# Patient Record
Sex: MALE | Race: WHITE | NOT HISPANIC OR LATINO | Employment: OTHER | URBAN - METROPOLITAN AREA
[De-identification: names, ages, dates, MRNs, and addresses within clinical notes are randomized per-mention and may not be internally consistent; named-entity substitution may affect disease eponyms.]

---

## 2021-08-17 ENCOUNTER — HISTORICAL (OUTPATIENT)
Dept: ADMINISTRATIVE | Facility: HOSPITAL | Age: 76
End: 2021-08-17

## 2021-10-26 ENCOUNTER — HISTORICAL (OUTPATIENT)
Dept: ADMINISTRATIVE | Facility: HOSPITAL | Age: 76
End: 2021-10-26

## 2022-04-10 ENCOUNTER — HISTORICAL (OUTPATIENT)
Dept: ADMINISTRATIVE | Facility: HOSPITAL | Age: 77
End: 2022-04-10

## 2022-04-11 ENCOUNTER — HISTORICAL (OUTPATIENT)
Dept: ADMINISTRATIVE | Facility: HOSPITAL | Age: 77
End: 2022-04-11

## 2022-04-28 VITALS
OXYGEN SATURATION: 96 % | HEIGHT: 69 IN | DIASTOLIC BLOOD PRESSURE: 69 MMHG | WEIGHT: 169.63 LBS | BODY MASS INDEX: 25.12 KG/M2 | SYSTOLIC BLOOD PRESSURE: 129 MMHG

## 2022-04-28 VITALS
SYSTOLIC BLOOD PRESSURE: 129 MMHG | OXYGEN SATURATION: 96 % | WEIGHT: 169.63 LBS | DIASTOLIC BLOOD PRESSURE: 69 MMHG | BODY MASS INDEX: 25.12 KG/M2 | HEIGHT: 69 IN

## 2022-10-24 DIAGNOSIS — R52 PAIN: Primary | ICD-10-CM

## 2022-10-24 RX ORDER — BUTALBITAL, ACETAMINOPHEN AND CAFFEINE 50; 325; 40 MG/1; MG/1; MG/1
TABLET ORAL
Qty: 30 TABLET | Refills: 3 | OUTPATIENT
Start: 2022-10-24 | End: 2022-11-28 | Stop reason: SDUPTHER

## 2022-10-26 DIAGNOSIS — Z12.5 SCREENING FOR MALIGNANT NEOPLASM OF PROSTATE: ICD-10-CM

## 2022-10-26 DIAGNOSIS — Z00.00 WELLNESS EXAMINATION: ICD-10-CM

## 2022-10-26 DIAGNOSIS — I10 HYPERTENSION, UNSPECIFIED TYPE: Primary | ICD-10-CM

## 2022-10-26 DIAGNOSIS — E78.5 HYPERLIPIDEMIA, UNSPECIFIED HYPERLIPIDEMIA TYPE: ICD-10-CM

## 2022-11-01 LAB
ALBUMIN SERPL-MCNC: 4.7 G/DL (ref 3.7–4.7)
ALBUMIN/GLOB SERPL: 2.4 {RATIO} (ref 1.2–2.2)
ALP SERPL-CCNC: 111 IU/L (ref 44–121)
ALT SERPL-CCNC: 13 IU/L (ref 0–44)
AST SERPL-CCNC: 13 IU/L (ref 0–40)
BASOPHILS # BLD AUTO: 0 X10E3/UL (ref 0–0.2)
BASOPHILS NFR BLD AUTO: 1 %
BILIRUB SERPL-MCNC: 0.2 MG/DL (ref 0–1.2)
BUN SERPL-MCNC: 17 MG/DL (ref 8–27)
BUN/CREAT SERPL: 17 (ref 10–24)
CALCIUM SERPL-MCNC: 9.6 MG/DL (ref 8.6–10.2)
CHLORIDE SERPL-SCNC: 94 MMOL/L (ref 96–106)
CHOLEST SERPL-MCNC: 282 MG/DL (ref 100–199)
CO2 SERPL-SCNC: 28 MMOL/L (ref 20–29)
CREAT SERPL-MCNC: 1.03 MG/DL (ref 0.76–1.27)
EOSINOPHIL # BLD AUTO: 0.4 X10E3/UL (ref 0–0.4)
EOSINOPHIL NFR BLD AUTO: 6 %
ERYTHROCYTE [DISTWIDTH] IN BLOOD BY AUTOMATED COUNT: 12.5 % (ref 11.6–15.4)
EST. GFR  (NO RACE VARIABLE): 75 ML/MIN/1.73
GLOBULIN SER CALC-MCNC: 2 G/DL (ref 1.5–4.5)
GLUCOSE SERPL-MCNC: 99 MG/DL (ref 70–99)
HCT VFR BLD AUTO: 40.8 % (ref 37.5–51)
HDLC SERPL-MCNC: 85 MG/DL
HGB BLD-MCNC: 13.5 G/DL (ref 13–17.7)
IMM GRANULOCYTES NFR BLD AUTO: 0 %
LDLC SERPL CALC-MCNC: 182 MG/DL (ref 0–99)
LYMPHOCYTES # BLD AUTO: 2.6 X10E3/UL (ref 0.7–3.1)
LYMPHOCYTES NFR BLD AUTO: 40 %
MCH RBC QN AUTO: 31.6 PG (ref 26.6–33)
MCHC RBC AUTO-ENTMCNC: 33.1 G/DL (ref 31.5–35.7)
MCV RBC AUTO: 96 FL (ref 79–97)
MONOCYTES # BLD AUTO: 0.8 X10E3/UL (ref 0.1–0.9)
MONOCYTES NFR BLD AUTO: 13 %
NEUTROPHILS # BLD AUTO: 2.6 X10E3/UL (ref 1.4–7)
NEUTROPHILS NFR BLD AUTO: 40 %
PLATELET # BLD AUTO: 243 X10E3/UL (ref 150–450)
POTASSIUM SERPL-SCNC: 5.6 MMOL/L (ref 3.5–5.2)
PROT SERPL-MCNC: 6.7 G/DL (ref 6–8.5)
PSA SERPL-MCNC: 2.3 NG/ML (ref 0–4)
RBC # BLD AUTO: 4.27 X10E6/UL (ref 4.14–5.8)
SODIUM SERPL-SCNC: 135 MMOL/L (ref 134–144)
TRIGL SERPL-MCNC: 91 MG/DL (ref 0–149)
VLDLC SERPL CALC-MCNC: 15 MG/DL (ref 5–40)
WBC # BLD AUTO: 6.5 X10E3/UL (ref 3.4–10.8)

## 2022-11-07 ENCOUNTER — OFFICE VISIT (OUTPATIENT)
Dept: PRIMARY CARE CLINIC | Facility: CLINIC | Age: 77
End: 2022-11-07
Payer: MEDICARE

## 2022-11-07 VITALS
OXYGEN SATURATION: 97 % | SYSTOLIC BLOOD PRESSURE: 139 MMHG | WEIGHT: 169 LBS | DIASTOLIC BLOOD PRESSURE: 60 MMHG | BODY MASS INDEX: 24.75 KG/M2 | HEART RATE: 83 BPM

## 2022-11-07 DIAGNOSIS — L40.9 PSORIASIS: ICD-10-CM

## 2022-11-07 DIAGNOSIS — D64.9 ANEMIA, UNSPECIFIED TYPE: ICD-10-CM

## 2022-11-07 DIAGNOSIS — H40.10X0 OPEN-ANGLE GLAUCOMA, UNSPECIFIED GLAUCOMA STAGE, UNSPECIFIED LATERALITY, UNSPECIFIED OPEN-ANGLE GLAUCOMA TYPE: ICD-10-CM

## 2022-11-07 DIAGNOSIS — E87.5 HYPERKALEMIA: ICD-10-CM

## 2022-11-07 DIAGNOSIS — Z00.00 WELLNESS EXAMINATION: Primary | ICD-10-CM

## 2022-11-07 DIAGNOSIS — E78.5 HYPERLIPIDEMIA, UNSPECIFIED HYPERLIPIDEMIA TYPE: ICD-10-CM

## 2022-11-07 DIAGNOSIS — Z12.5 PROSTATE CANCER SCREENING: ICD-10-CM

## 2022-11-07 DIAGNOSIS — Z23 NEED FOR VACCINATION: ICD-10-CM

## 2022-11-07 PROCEDURE — G0439 PPPS, SUBSEQ VISIT: HCPCS | Mod: ,,, | Performed by: FAMILY MEDICINE

## 2022-11-07 PROCEDURE — 90732 PNEUMOCOCCAL POLYSACCHARIDE VACCINE 23-VALENT =>2YO SQ IM: ICD-10-PCS | Mod: ,,, | Performed by: FAMILY MEDICINE

## 2022-11-07 PROCEDURE — G0009 PNEUMOCOCCAL POLYSACCHARIDE VACCINE 23-VALENT =>2YO SQ IM: ICD-10-PCS | Mod: ,,, | Performed by: FAMILY MEDICINE

## 2022-11-07 PROCEDURE — 90732 PPSV23 VACC 2 YRS+ SUBQ/IM: CPT | Mod: ,,, | Performed by: FAMILY MEDICINE

## 2022-11-07 PROCEDURE — G0439 PR MEDICARE ANNUAL WELLNESS SUBSEQUENT VISIT: ICD-10-PCS | Mod: ,,, | Performed by: FAMILY MEDICINE

## 2022-11-07 PROCEDURE — G0009 ADMIN PNEUMOCOCCAL VACCINE: HCPCS | Mod: ,,, | Performed by: FAMILY MEDICINE

## 2022-11-07 RX ORDER — PRAVASTATIN SODIUM 10 MG/1
10 TABLET ORAL
COMMUNITY
Start: 2021-11-04 | End: 2022-11-07 | Stop reason: SDUPTHER

## 2022-11-07 RX ORDER — PRAVASTATIN SODIUM 10 MG/1
10 TABLET ORAL EVERY OTHER DAY
Qty: 45 TABLET | Refills: 3 | Status: SHIPPED | OUTPATIENT
Start: 2022-11-07

## 2022-11-07 RX ORDER — DORZOLAMIDE HYDROCHLORIDE AND TIMOLOL MALEATE 20; 5 MG/ML; MG/ML
SOLUTION/ DROPS OPHTHALMIC
COMMUNITY
Start: 2022-09-29

## 2022-11-07 NOTE — PROGRESS NOTES
Patient ID: [id number]     Chief Complaint: Medicare AWV        HPI:     Fred Campos is a 76 y.o. male here today for a Medicare Wellness.  He is a retired pharmacist. Patient presents for wellness visit.  Lab work performed prior to this visit includes FLP with LDL elevated at 182 was increased from 121 after discontinuation of previously prescribed for pravastatin due to myalgia.  Labs include PSA at 2.3 with CBC revealing no overly concerning findings with CMP showing elevated potassium at 5.6. Reports daily banana consumption.    PMH:  Glaucoma-open angle, prior surgical intervention 2021   Chronic low back pain-prior left hemilaminectomy of L4/L5   Hyperlipidemia-houses with Crestor Lipitor, previously pravastatin  Psoriasis-as needed clobetasol  Migraines-as needed Fioricet      A separate E/M code has been provided to evaluate additional complaints that the patient would like addressed during the dedicated Medicare Wellness Exam.        ----------------------------  HLD (hyperlipidemia)     Past Surgical History:   Procedure Laterality Date    APPENDECTOMY      cataract surgery         Review of patient's allergies indicates:  No Known Allergies    Outpatient Medications Marked as Taking for the 22 encounter (Office Visit) with Alberto Poe MD   Medication Sig Dispense Refill    butalbital-acetaminophen-caffeine -40 mg (FIORICET, ESGIC) -40 mg per tablet TAKE ONE TABLET BY MOUTH EVERY 4 HOURS AS NEEDED (do not exceed SIX TABLET per day  Strength: -40 mg 30 tablet 3    dorzolamide-timolol 2-0.5% (COSOPT) 22.3-6.8 mg/mL ophthalmic solution SMARTSI Drop(s) Left Eye Morning-Night      [DISCONTINUED] pravastatin (PRAVACHOL) 10 MG tablet Take 10 mg by mouth.         Social History     Socioeconomic History    Marital status:    Tobacco Use    Smoking status: Never    Smokeless tobacco: Never   Substance and Sexual Activity    Alcohol use: Never    Drug use: Never     Sexual activity: Yes        History reviewed. No pertinent family history.     Patient Care Team:  Alberto Poe MD as PCP - General (Family Medicine)       Subjective:     Review of Systems   Constitutional:  Negative for chills and fever.   HENT:  Negative for congestion and sore throat.    Respiratory:  Negative for cough and wheezing.    Cardiovascular:  Negative for chest pain and palpitations.   Gastrointestinal:  Negative for nausea and vomiting.   Genitourinary:  Negative for dysuria and urgency.   Musculoskeletal:  Negative for joint pain and myalgias.   Skin:  Negative for itching and rash.   Neurological:  Negative for dizziness and weakness.   Psychiatric/Behavioral:  Negative for depression. The patient is not nervous/anxious.        Patient Reported Health Risk Assessments:  What [age] 70-79  Are you male or female?: Male  During the past four weeks, how much have you been bothered by emotional problems such as feeling anxious, depressed, irritable, sad, or downhearted and blue?: Not at all  During the past five weeks, has your physical and/or emotional health limited your social activities with family, friends, neighbors, or groups?: Not at all  During the past four weeks, how much bodily pain have you generally had?: Mild pain  During the past four weeks, was someone available to help if you needed and wanted help?: Yes, as much as I wanted  During the past four weeks, what was the hardest physical activity you could do for at least two minutes?: Moderate  Can you get to places out of walking distance without help?  (For example, can you travel alone on buses or taxis, or drive your own car?): Yes  Can you go shopping for groceries or clothes without someone's help?: Yes  Can you prepare your own meals?: Yes  Can you do your own housework without help?: Yes  Because of any health problems, do you need the help of another person with your personal care needs such as eating, bathing,  dressing, or getting around the house?: No  Can you handle your own money without help?: Yes  During the past four weeks, how would you rate your health in general?: Very good  How have things been going for you during the past four weeks?: Pretty well  Are you having difficulties driving your car?: No  Do you always fasten your seat belt when you are in a car?: Yes, usually  How often in the past four weeks have you been bothered by falling or dizzy when standing up?: Sometimes  How often in the past four weeks have you been bothered by sexual problems?: Never  How often in the past four weeks have you been bothered by trouble eating well?: Never  How often in the past four weeks have you been bothered by teeth or denture problems?: Never  How often in the past four weeks have you been bothered with problems using the telephone?: Never  How often in the past four weeks have you been bothered by tiredness or fatigue?: Never  Have you fallen two or more times in the past year?: No  Are you afraid of falling?: No  Are you a smoker?: Yes, I'm not ready to quit  During the past four weeks, how many drinks of wine, beer, or other alcoholic beverages did you have?: No alcohol at all  Do you exercise for about 20 minutes three or more days a week?: No, I usually do not exercise this much  Have you been given any information to help you with hazards in your house that might hurt you?: Yes  Have you been given any information to help you with keeping track of your medications?: Yes  How often do you have trouble taking medicines the way you've been told to take them?: I always take them as prescribed  How confident are you that you can control and manage most of your health problems?: Very confident    Objective:     /60   Pulse 83   Wt 76.7 kg (169 lb)   SpO2 97%   BMI 24.75 kg/m²     Physical Exam  Constitutional:       Appearance: Normal appearance.   HENT:      Head: Normocephalic and atraumatic.   Eyes:       General:         Right eye: No discharge.         Left eye: No discharge.      Conjunctiva/sclera: Conjunctivae normal.   Cardiovascular:      Rate and Rhythm: Normal rate and regular rhythm.   Pulmonary:      Effort: Pulmonary effort is normal.      Breath sounds: Normal breath sounds.   Abdominal:      Palpations: Abdomen is soft.      Tenderness: There is no abdominal tenderness.   Skin:     General: Skin is warm and dry.   Neurological:      General: No focal deficit present.      Mental Status: He is alert and oriented to person, place, and time.   Psychiatric:         Mood and Affect: Mood normal.         Behavior: Behavior normal.         Assessment:       ICD-10-CM ICD-9-CM   1. Wellness examination  Z00.00 V70.0   2. Hyperlipidemia, unspecified hyperlipidemia type  E78.5 272.4   3. Open-angle glaucoma, unspecified glaucoma stage, unspecified laterality, unspecified open-angle glaucoma type  H40.10X0 365.10     365.70   4. Psoriasis  L40.9 696.1   5. Hyperkalemia  E87.5 276.7   6. Prostate cancer screening  Z12.5 V76.44   7. Anemia, unspecified type  D64.9 285.9   8. Need for vaccination  Z23 V05.9        Plan:       Medicare Annual Wellness and Personalized Prevention Plan:     Fall Risk + Home Safety + Living Situation + Whisper Test + Depression Screen + CAGE + Cognitive Impairment Screen + ADL Screen + Timed Get Up and Go + Nutrition Screen + PAQ Screen + Health Risk Assessment all reviewed.     No flowsheet data found.  Fall Risk Assessment - Outpatient 11/7/2022   Mobility Status Ambulatory   Number of falls 0   Identified as fall risk 0                   Depression Screening  Over the past two weeks, has the patient felt down, depressed, or hopeless?: No  Over the past two weeks, has the patient felt little interest or pleasure in doing things?: No  Functional Ability/Safety Screening  Was the patient's timed Up & Go test unsteady or longer than 30 seconds?: No  Does the patient need help with phone,  transportation, shopping, preparing meals, housework, laundry, meds, or managing money?: No  Does the patient's home have rugs in the hallway, lack grab bars in the bathroom, lack handrails on the stairs or have poor lighting?: No  Have you noticed any hearing difficulties?: No  Cognitive Function (Assessed through direct observation with due consideration of information obtained by way of patient reports and/or concerns raised by family, friends, caretakers, or others)    Does the patient repeat questions/statements in the same day?: No  Does the patient have trouble remembering the date, year, and time?: No  Does the patient have difficulty managing finances?: No  Does the patient have a decreased sense of direction?: No      Offer of free  was accepted or rejected?: rejected  If  rejected, why?: Patient states understands English    Alcohol/Tobacco Use - Stressed importance of smoking cessation and limiting alcohol intake.  CVD Risk Factors - Reviewed  Obesity/Physical Activity - Normal BMI. Encouraged daily 30 minute physical activity x 5 days per week.    Opioid Screening: Patient medication list reviewed, patient is not taking prescription opioids. Patient is not using additional opioids than prescribed. Patient is at low risk of substance abuse based on this opioid use history.       Health Maintenance Topics with due status: Not Due       Topic Last Completion Date    Lipid Panel 11/01/2022        AAA Screening - No prior screening. Will order US AAA for Welcome to Medicare.  Prostate Cancer Screening - No prior screening. Will order PSA today. 1/2018. Follow up annually.  Cervical Cancer Screening - Last Pap on - 1/2018. NIL. Follow up with GYN Clinic for Pap/Pelvic.   Breast Cancer Screening - Last Mammogram on 1/2018. Normal. Follow up in 1 year    Colon Cancer Screening - Colonoscopy on 1/2018. Follow up in 1 year . FIT Negative on 1/2018.  Osteoporosis Screening - Last  DEXA on 1/2018. Results show Normal Bone Density. Follow up in 1 year.  Eye Exam - Last Eye Exam - 1/2018.  Dental Exam - Recommend biannually  Vaccinations -   Immunization History   Administered Date(s) Administered    Pneumococcal Polysaccharide - 23 Valent 11/07/2022        Advance Directives:   Living Will: No        Oral Declaration: No    [place]: No    Do Not Resuscitate Status: No    Medical Power of : No        Oral Declaration: No      Decision Making:  Patient answered questions  Goals of Care: The patient endorses that what is most important right now is to focus on remaining as independent as possible    Accordingly, we have decided that the best plan to meet the patient's goals includes continuing with treatment  Advance Care Planning   Advanced Care Planning: I offered to discuss Advanced Care Planning, which consists of how you would like to be cared for as you end the near of your natural life.  This includes how to pick a person who would make decisions for you if you were unable to make them for yourself, which is called a Medical Power of . These discussions include what kind of decisions you will make regarding life sustaining measures, such as ventilators and feeding tubes, when faced with a life limiting illness recorded on a living will that they will need to know. Advanced Directive discussion declined by patient.  Spent 20 minutes with the patient/family on the importance of Advanced Care Planning.          1. Wellness examination  Assessment & Plan:  Discussed routine annual health maintenance and screening in addition to acute issues noted below.    Orders:  -     CBC Auto Differential; Future; Expected date: 11/07/2023  -     Comprehensive Metabolic Panel; Future; Expected date: 11/07/2023    2. Hyperlipidemia, unspecified hyperlipidemia type  Assessment & Plan:  Recommend reinitiation and compliance with statin medication and minimal dose of 10 mg every other day  pravastatin    Orders:  -     Lipid Panel; Future; Expected date: 2023  -     Lipid Panel; Future; Expected date: 2023    3. Open-angle glaucoma, unspecified glaucoma stage, unspecified laterality, unspecified open-angle glaucoma type  Overview:  Follow-up with eye specialist for routine monitoring and management      4. Psoriasis  Overview:  PRN clobetasol      5. Hyperkalemia  Overview:  Deescalate potassium intake with diet reassess levels at follow-up in consider the Lasix or Kayexalate if persistent elevation is noted    Orders:  -     Comprehensive Metabolic Panel; Future; Expected date: 2023    6. Prostate cancer screening  -     PSA, Screening; Future; Expected date: 2023    7. Anemia, unspecified type  -     CBC Auto Differential; Future; Expected date: 2023    8. Need for vaccination  -     Pneumococcal Polysaccharide Vaccine (23 Valent) (SQ/IM)    Other orders  -     pravastatin (PRAVACHOL) 10 MG tablet; Take 1 tablet (10 mg total) by mouth every other day.  Dispense: 45 tablet; Refill: 3         Medication List with Changes/Refills   Current Medications    BUTALBITAL-ACETAMINOPHEN-CAFFEINE -40 MG (FIORICET, ESGIC) -40 MG PER TABLET    TAKE ONE TABLET BY MOUTH EVERY 4 HOURS AS NEEDED (do not exceed SIX TABLET per day  Strength: -40 mg       Start Date: 10/24/2022End Date: --    DORZOLAMIDE-TIMOLOL 2-0.5% (COSOPT) 22.3-6.8 MG/ML OPHTHALMIC SOLUTION    SMARTSI Drop(s) Left Eye Morning-Night       Start Date: 2022 End Date: --   Changed and/or Refilled Medications    Modified Medication Previous Medication    PRAVASTATIN (PRAVACHOL) 10 MG TABLET pravastatin (PRAVACHOL) 10 MG tablet       Take 1 tablet (10 mg total) by mouth every other day.    Take 10 mg by mouth.       Start Date: 2022 End Date: --    Start Date: 2021 End Date: 2022          The patient's Health Maintenance was reviewed and the following appears to be due at this time:    Health Maintenance Due   Topic Date Due    Hepatitis C Screening  Never done    TETANUS VACCINE  Never done    Shingles Vaccine (1 of 2) Never done    COVID-19 Vaccine (3 - Booster for Moderna series) 04/26/2021    Influenza Vaccine (1) Never done           Follow up in about 1 year (around 11/8/2023). In addition to their scheduled follow up, the patient has also been instructed to follow up on as needed basis.     Provided patient with a 5-10 year written screening schedule and personal prevention plan. Recommendations were developed using the USPSTF age appropriate recommendations. Education, counseling, and referrals were provided as needed. After Visit Summary printed and given to patient which includes a list of additional screenings\tests needed.

## 2022-11-07 NOTE — ASSESSMENT & PLAN NOTE
Recommend reinitiation and compliance with statin medication and minimal dose of 10 mg every other day pravastatin

## 2022-11-28 DIAGNOSIS — R52 PAIN: ICD-10-CM

## 2022-11-28 RX ORDER — BUTALBITAL, ACETAMINOPHEN AND CAFFEINE 50; 325; 40 MG/1; MG/1; MG/1
TABLET ORAL
Qty: 30 TABLET | Refills: 3 | OUTPATIENT
Start: 2022-11-28 | End: 2022-11-29 | Stop reason: SDUPTHER

## 2022-11-29 DIAGNOSIS — R52 PAIN: ICD-10-CM

## 2022-11-29 RX ORDER — BUTALBITAL, ACETAMINOPHEN AND CAFFEINE 50; 325; 40 MG/1; MG/1; MG/1
TABLET ORAL
Qty: 30 TABLET | Refills: 3 | OUTPATIENT
Start: 2022-11-29 | End: 2022-12-06

## 2023-01-17 DIAGNOSIS — R52 PAIN: ICD-10-CM

## 2023-01-17 RX ORDER — BUTALBITAL, ACETAMINOPHEN AND CAFFEINE 50; 325; 40 MG/1; MG/1; MG/1
TABLET ORAL
Qty: 30 TABLET | Refills: 0 | Status: SHIPPED | OUTPATIENT
Start: 2023-01-17 | End: 2023-04-04

## 2023-02-06 PROBLEM — Z00.00 WELLNESS EXAMINATION: Status: RESOLVED | Noted: 2022-11-07 | Resolved: 2023-02-06

## 2023-06-27 DIAGNOSIS — R52 PAIN: ICD-10-CM

## 2023-06-27 RX ORDER — BUTALBITAL, ACETAMINOPHEN AND CAFFEINE 50; 325; 40 MG/1; MG/1; MG/1
1 TABLET ORAL EVERY 6 HOURS PRN
Qty: 30 TABLET | Refills: 3 | Status: SHIPPED | OUTPATIENT
Start: 2023-06-27 | End: 2024-02-05 | Stop reason: SDUPTHER

## 2023-10-30 ENCOUNTER — TELEPHONE (OUTPATIENT)
Dept: PRIMARY CARE CLINIC | Facility: CLINIC | Age: 78
End: 2023-10-30
Payer: MEDICARE

## 2023-10-30 NOTE — TELEPHONE ENCOUNTER
Labs faxed to [facility]  ----- Message from Shanique Rivas sent at 10/30/2023 11:07 AM CDT -----  Regarding: orders  .Caller is requesting to schedule their Lab appointment prior to annual appointment.  Order is not listed in EPIC.  Please enter order and contact patient to schedule.    Name of Caller:ross Hull Date and Time of Labs:10/30    Date of EPP Appointment:11/08    Where would they like the lab performed?[facility]    Would the patient rather a call back or a response via [facility]? foster    Best Call Back Number:[phone]     Additional Information:pt needs orders sent to [facility] in [place] pls call when sent

## 2023-10-30 NOTE — PROGRESS NOTES
Date: 11/08/2023  Patient ID: [id number]   Chief Complaint: Medicare AWV    HPI:   Fred Campos is a 77 y.o. male here today for a Medicare Wellness.     Opioid Screening: Patient medication list reviewed, patient is not taking prescription opioids. Patient is not using additional opioids than prescribed. Patient is at low risk of substance abuse based on this opioid use history.     Otherwise, patient reports to be doing well. WNL CMP, CBC; cholesterol and LDL elevated; PSA increased to 3.4. Has not been on statin 2/2 myalgia. Not interested in lipid medications. 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 Not interested in other medications    Diet: avoids fats; eats lean meats.  Activity level: plays golf. Home exercises 20min 5d/wk  PSA: 3.4  CRC: last time wnl and no longer participating     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    Psoriasis    Elevated lipoprotein(a)    Elevated blood pressure reading    Myalgia due to statin    Tobacco use    PVD (peripheral vascular disease)    Valvular heart disease    Hiatal hernia     Outpatient Medications Marked as Taking for the 11/8/23 encounter (Office Visit) with Ronna Shirley MD   Medication Sig Dispense Refill    butalbital-acetaminophen-caffeine -40 mg (FIORICET, ESGIC) -40 mg per tablet Take 1 tablet by mouth every 6 (six) hours as needed for Pain or Headaches. 30 tablet 3    dorzolamide (TRUSOPT) 2 % ophthalmic solution Place 1 drop into the left eye 2 (two) times daily.      famotidine (PEPCID) 20 MG tablet Take 20 mg by mouth Daily.       Past Medical History:   Diagnosis Date    Hiatal hernia 11/8/2023    HLD (hyperlipidemia)     Hyperkalemia 11/7/2022    Deescalate potassium intake with diet reassess levels at follow-up in consider the Lasix or Kayexalate if persistent elevation is noted    Myalgia due to statin 11/8/2023    PVD (peripheral vascular disease)  11/8/2023    Tobacco use 11/8/2023    Valvular heart disease 11/8/2023     Past Surgical History:   Procedure Laterality Date    APPENDECTOMY      cataract surgery       Review of patient's allergies indicates:  No Known Allergies  History reviewed. No pertinent family history.   Social History     Socioeconomic History    Marital status:    Tobacco Use    Smoking status: Every Day     Types: Cigarettes    Smokeless tobacco: Never    Tobacco comments:     1ppd since 17y   Substance and Sexual Activity    Alcohol use: Yes     Comment: once at night    Drug use: Never    Sexual activity: Yes     Social Determinants of Health     Financial Resource Strain: Low Risk  (11/8/2023)    Overall Financial Resource Strain (CARDIA)     Difficulty of Paying Living Expenses: Not hard at all   Food Insecurity: No Food Insecurity (11/8/2023)    Hunger Vital Sign     Worried About Running Out of Food in the Last Year: Never true     Ran Out of Food in the Last Year: Never true   Transportation Needs: No Transportation Needs (11/8/2023)    PRAPARE - Transportation     Lack of Transportation (Medical): No     Lack of Transportation (Non-Medical): No   Physical Activity: Insufficiently Active (11/8/2023)    Exercise Vital Sign     Days of Exercise per Week: 3 days     Minutes of Exercise per Session: 20 min   Stress: No Stress Concern Present (11/8/2023)    [place] [employer] - Occupational Stress Questionnaire     Feeling of Stress : Not at all   Social Connections: Moderately Integrated (11/8/2023)    Social Connection and Isolation Panel [NHANES]     Frequency of Communication with Friends and Family: Twice a week     Frequency of Social Gatherings with Friends and Family: Once a week     Attends [employer] Services: More than 4 times per year     Active Member of Clubs or Organizations: No     Attends Club or Organization Meetings: Never     Marital Status:    Housing Stability: Unknown  (11/8/2023)    Housing Stability Vital Sign     Unable to Pay for Housing in the Last Year: No     Unstable Housing in the Last Year: No     Patient Care Team:  Ronna Shirley MD as PCP - General (Family Medicine)  Stef Syed MD as Consulting Physician (Cardiology)  Lona Gutierrez MD as Consulting Physician (Ophthalmology)   Subjective:   Review of Systems   Constitutional:  Negative for fever and weight loss.   HENT:  Negative for congestion, hearing loss and sore throat.    Eyes:  Negative for blurred vision and double vision.   Respiratory:  Negative for cough and shortness of breath.    Cardiovascular:  Negative for chest pain and palpitations.   Gastrointestinal:  Negative for abdominal pain and diarrhea.   Genitourinary:  Negative for dysuria, frequency, hematuria and urgency.   Musculoskeletal:  Negative for falls.   Skin:  Negative for rash.   Psychiatric/Behavioral:  Negative for depression and memory loss. The patient is not nervous/anxious and does not have insomnia.      See HPI for details  All Other ROS: Negative except as stated in HPI  Patient Reported Health Risk Assessment  What [age] 70-79  Are you male or female?: Male  During the past four weeks, how much have you been bothered by emotional problems such as feeling anxious, depressed, irritable, sad, or downhearted and blue?: Not at all  During the past five weeks, has your physical and/or emotional health limited your social activities with family, friends, neighbors, or groups?: Not at all  During the past four weeks, how much bodily pain have you generally had?: Mild pain  During the past four weeks, was someone available to help if you needed and wanted help?: Yes, as much as I wanted  During the past four weeks, what was the hardest physical activity you could do for at least two minutes?: Moderate  Can you get to places out of walking distance without help?  (For example, can you travel alone on buses or taxis, or drive  your own car?): Yes  Can you go shopping for groceries or clothes without someone's help?: Yes  Can you prepare your own meals?: Yes  Can you do your own housework without help?: Yes  Because of any health problems, do you need the help of another person with your personal care needs such as eating, bathing, dressing, or getting around the house?: No  Can you handle your own money without help?: Yes  During the past four weeks, how would you rate your health in general?: Good  How have things been going for you during the past four weeks?: Pretty well  Are you having difficulties driving your car?: No  Do you always fasten your seat belt when you are in a car?: Yes, usually  How often in the past four weeks have you been bothered by falling or dizzy when standing up?: Never  How often in the past four weeks have you been bothered by sexual problems?: Never  How often in the past four weeks have you been bothered by trouble eating well?: Never  How often in the past four weeks have you been bothered by teeth or denture problems?: Never  How often in the past four weeks have you been bothered with problems using the telephone?: Never  How often in the past four weeks have you been bothered by tiredness or fatigue?: Never  Have you fallen two or more times in the past year?: No  Are you afraid of falling?: No  Are you a smoker?: No  During the past four weeks, how many drinks of wine, beer, or other alcoholic beverages did you have?: No alcohol at all  Do you exercise for about 20 minutes three or more days a week?: Yes, most of the time  Have you been given any information to help you with hazards in your house that might hurt you?: Yes  Have you been given any information to help you with keeping track of your medications?: Yes  How often do you have trouble taking medicines the way you've been told to take them?: I always take them as prescribed  How confident are you that you can control and manage most of your  "health problems?: Very confident  What is your race? (Check all that apply.):   Objective:   BP (!) 138/59   Pulse 78   Temp 97.7 °F (36.5 °C)   Ht 5' 9" (1.753 m)   Wt 77.5 kg (170 lb 14.4 oz)   SpO2 (!) 92%   BMI 25.24 kg/m²   Physical Exam  Constitutional:       Appearance: Normal appearance.   HENT:      Head: Normocephalic and atraumatic.      Right Ear: Tympanic membrane, ear canal and external ear normal.      Left Ear: Tympanic membrane, ear canal and external ear normal.      Nose: No congestion or rhinorrhea.      Mouth/Throat:      Mouth: Mucous membranes are moist.      Pharynx: No oropharyngeal exudate or posterior oropharyngeal erythema.   Eyes:      General: No scleral icterus.        Right eye: No discharge.         Left eye: No discharge.      Extraocular Movements: Extraocular movements intact.      Conjunctiva/sclera: Conjunctivae normal.      Comments: L cornea with irregular shape   Cardiovascular:      Rate and Rhythm: Normal rate and regular rhythm.      Pulses: Normal pulses.      Heart sounds: Normal heart sounds.      Comments: Thready pulses BL  Pulmonary:      Effort: Pulmonary effort is normal.      Breath sounds: Wheezing (denies SOB or meds at this time) present.   Abdominal:      General: Abdomen is flat. Bowel sounds are normal.      Palpations: Abdomen is soft.   Musculoskeletal:         General: No deformity. Normal range of motion.      Cervical back: Normal range of motion and neck supple.   Skin:     General: Skin is warm and dry.   Neurological:      Mental Status: He is alert and oriented to person, place, and time.   Psychiatric:         Mood and Affect: Mood normal.         Behavior: Behavior normal.         Thought Content: Thought content normal.         Judgment: Judgment normal.            No data to display                  11/8/2023     1:00 PM 11/7/2022     2:00 PM   Fall Risk Assessment - Outpatient   Mobility Status Ambulatory Ambulatory   Number of " falls 0 0   Identified as fall risk False False           Depression Screening  Over the past two weeks, has the patient felt down, depressed, or hopeless?: No  Over the past two weeks, has the patient felt little interest or pleasure in doing things?: No  Functional Ability/Safety Screening  Was the patient's timed Up & Go test unsteady or longer than 30 seconds?: No  Does the patient need help with phone, transportation, shopping, preparing meals, housework, laundry, meds, or managing money?: No  Does the patient's home have rugs in the hallway, lack grab bars in the bathroom, lack handrails on the stairs or have poor lighting?: No  Have you noticed any hearing difficulties?: No  Cognitive Function (Assessed through direct observation with due consideration of information obtained by way of patient reports and/or concerns raised by family, friends, caretakers, or others)    Does the patient repeat questions/statements in the same day?: No  Does the patient have trouble remembering the date, year, and time?: No  Does the patient have difficulty managing finances?: No  Does the patient have a decreased sense of direction?: No  Assessment:       ICD-10-CM ICD-9-CM   1. Wellness examination  Z00.00 V70.0   2. Elevated lipoprotein(a)  E78.41 272.8   3. Tobacco use  Z72.0 305.1      Plan:   1. Wellness examination  Overview:  Routine labs and preventative care discussed.  Continue healthy lifestyle modifications      Orders:  -     CBC Auto Differential; Future; Expected date: 11/08/2024  -     Comprehensive Metabolic Panel; Future; Expected date: 11/08/2024  -     Lipid Panel; Future; Expected date: 11/08/2024  -     TSH; Future; Expected date: 11/08/2024  -     Hemoglobin A1C; Future; Expected date: 11/08/2024  -     Urinalysis; Future; Expected date: 11/08/2024  -     PSA, Screening; Future; Expected date: 11/08/2024  -     Hepatitis C Antibody; Future; Expected date: 11/08/2024    2. Elevated  lipoprotein(a)  Overview:  Did not tolerate statins  Not interested in meds at this time  Can try omega-3 fatty acids (total,LDL,TG), niacin (TG), CoQ10 (total), Red yeast rice (LDL,TG,inc HDL)  Encourage weight loss by least 5% and to increase aerobic exercise and resistance training  Limit simple sugars and simple carbohydrate intake-focus on low glycemic foods  Optimize blood sugar control  The 10-year ASCVD risk score (Yojana COX, et al., 2019) is: 37.3%    Values used to calculate the score:      Age: 77 years      Sex: Male      Is Non- : No      Diabetic: No      Tobacco smoker: No      Systolic Blood Pressure: 155 mmHg      Is BP treated: No      HDL Cholesterol: 85 mg/dL      Total Cholesterol: 308 mg/dL           Orders:  -     CBC Auto Differential; Future; Expected date: 11/08/2024  -     Comprehensive Metabolic Panel; Future; Expected date: 11/08/2024  -     Lipid Panel; Future; Expected date: 11/08/2024  -     Hemoglobin A1C; Future; Expected date: 11/08/2024  -     Urinalysis; Future; Expected date: 11/08/2024    3. Tobacco use  Overview:  1ppd since 17y  Counseled patient on tobacco cessation and risks of continued tobacco use 3-5min      Orders:  -     Lipid Panel; Future; Expected date: 11/08/2024  -     Urinalysis; Future; Expected date: 11/08/2024  -     PSA, Screening; Future; Expected date: 11/08/2024         Medicare Annual Wellness and Personalized Prevention Plan:   Fall Risk + Home Safety + Hearing Impairment + Depression Screen + Opioid and Substance Abuse Screening + Cognitive Impairment Screen + Health Risk Assessment all reviewed.     Health Maintenance Topics with due status: Not Due       Topic Last Completion Date    Lipid Panel 10/31/2023      The patient's Health Maintenance was reviewed and the following appears to be due at this time:   Health Maintenance Due   Topic Date Due    Hepatitis C Screening  Never done    TETANUS VACCINE  Never done    Shingles  Vaccine (1 of 2) Never done    RSV Vaccine (Age 60+) (1 - 1-dose 60+ series) Never done    Influenza Vaccine (1) Never done    COVID-19 Vaccine (3 - 2023-24 season) 09/01/2023       Advance Care Planning     Date: 10/30/2023    Living Will, POA           Follow up in about 1 year (around 11/8/2024) for Medicare Wellness. In addition to their scheduled follow up, the patient has also been instructed to follow up on as needed basis.

## 2023-10-31 LAB
ALBUMIN SERPL-MCNC: 4.6 G/DL (ref 3.8–4.8)
ALBUMIN/GLOB SERPL: 2.1 {RATIO} (ref 1.2–2.2)
ALP SERPL-CCNC: 109 IU/L (ref 44–121)
ALT SERPL-CCNC: 13 IU/L (ref 0–44)
AST SERPL-CCNC: 13 IU/L (ref 0–40)
BASOPHILS # BLD AUTO: 0 X10E3/UL (ref 0–0.2)
BASOPHILS NFR BLD AUTO: 1 %
BILIRUB SERPL-MCNC: 0.2 MG/DL (ref 0–1.2)
BUN SERPL-MCNC: 15 MG/DL (ref 8–27)
BUN/CREAT SERPL: 16 (ref 10–24)
CALCIUM SERPL-MCNC: 9.5 MG/DL (ref 8.6–10.2)
CHLORIDE SERPL-SCNC: 94 MMOL/L (ref 96–106)
CHOLEST SERPL-MCNC: 308 MG/DL (ref 100–199)
CO2 SERPL-SCNC: 29 MMOL/L (ref 20–29)
CREAT SERPL-MCNC: 0.92 MG/DL (ref 0.76–1.27)
EOSINOPHIL # BLD AUTO: 0.3 X10E3/UL (ref 0–0.4)
EOSINOPHIL NFR BLD AUTO: 5 %
ERYTHROCYTE [DISTWIDTH] IN BLOOD BY AUTOMATED COUNT: 12.4 % (ref 11.6–15.4)
EST. GFR  (NO RACE VARIABLE): 85 ML/MIN/1.73
GLOBULIN SER CALC-MCNC: 2.2 G/DL (ref 1.5–4.5)
GLUCOSE SERPL-MCNC: 95 MG/DL (ref 70–99)
HCT VFR BLD AUTO: 41.2 % (ref 37.5–51)
HDLC SERPL-MCNC: 85 MG/DL
HGB BLD-MCNC: 13.7 G/DL (ref 13–17.7)
IMM GRANULOCYTES NFR BLD AUTO: 0 %
LABORATORY COMMENT REPORT: ABNORMAL
LDLC SERPL CALC-MCNC: 207 MG/DL (ref 0–99)
LYMPHOCYTES # BLD AUTO: 2.7 X10E3/UL (ref 0.7–3.1)
LYMPHOCYTES NFR BLD AUTO: 47 %
MCH RBC QN AUTO: 31.9 PG (ref 26.6–33)
MCHC RBC AUTO-ENTMCNC: 33.3 G/DL (ref 31.5–35.7)
MCV RBC AUTO: 96 FL (ref 79–97)
MONOCYTES # BLD AUTO: 0.7 X10E3/UL (ref 0.1–0.9)
MONOCYTES NFR BLD AUTO: 12 %
NEUTROPHILS # BLD AUTO: 2.1 X10E3/UL (ref 1.4–7)
NEUTROPHILS NFR BLD AUTO: 35 %
PLATELET # BLD AUTO: 222 X10E3/UL (ref 150–450)
POTASSIUM SERPL-SCNC: 4.8 MMOL/L (ref 3.5–5.2)
PROT SERPL-MCNC: 6.8 G/DL (ref 6–8.5)
PSA SERPL-MCNC: 3.4 NG/ML (ref 0–4)
RBC # BLD AUTO: 4.29 X10E6/UL (ref 4.14–5.8)
SODIUM SERPL-SCNC: 135 MMOL/L (ref 134–144)
SPECIMEN STATUS REPORT: NORMAL
TRIGL SERPL-MCNC: 99 MG/DL (ref 0–149)
VLDLC SERPL CALC-MCNC: 16 MG/DL (ref 5–40)
WBC # BLD AUTO: 5.7 X10E3/UL (ref 3.4–10.8)

## 2023-11-01 ENCOUNTER — DOCUMENTATION ONLY (OUTPATIENT)
Dept: PRIMARY CARE CLINIC | Facility: CLINIC | Age: 78
End: 2023-11-01
Payer: MEDICARE

## 2023-11-08 ENCOUNTER — OFFICE VISIT (OUTPATIENT)
Dept: PRIMARY CARE CLINIC | Facility: CLINIC | Age: 78
End: 2023-11-08
Payer: MEDICARE

## 2023-11-08 VITALS
HEIGHT: 69 IN | SYSTOLIC BLOOD PRESSURE: 138 MMHG | TEMPERATURE: 98 F | WEIGHT: 170.88 LBS | BODY MASS INDEX: 25.31 KG/M2 | OXYGEN SATURATION: 92 % | HEART RATE: 78 BPM | DIASTOLIC BLOOD PRESSURE: 59 MMHG

## 2023-11-08 DIAGNOSIS — Z00.00 WELLNESS EXAMINATION: Primary | ICD-10-CM

## 2023-11-08 DIAGNOSIS — E78.41 ELEVATED LIPOPROTEIN(A): ICD-10-CM

## 2023-11-08 DIAGNOSIS — Z72.0 TOBACCO USE: ICD-10-CM

## 2023-11-08 PROBLEM — R03.0 ELEVATED BLOOD PRESSURE READING: Status: ACTIVE | Noted: 2023-11-08

## 2023-11-08 PROBLEM — E87.5 HYPERKALEMIA: Status: RESOLVED | Noted: 2022-11-07 | Resolved: 2023-11-08

## 2023-11-08 PROBLEM — E78.5 HYPERLIPIDEMIA: Status: RESOLVED | Noted: 2022-11-07 | Resolved: 2023-11-08

## 2023-11-08 PROBLEM — M79.10 MYALGIA DUE TO STATIN: Status: ACTIVE | Noted: 2023-11-08

## 2023-11-08 PROBLEM — H40.1120: Status: ACTIVE | Noted: 2022-11-07

## 2023-11-08 PROBLEM — I38 VALVULAR HEART DISEASE: Status: ACTIVE | Noted: 2023-11-08

## 2023-11-08 PROBLEM — K44.9 HIATAL HERNIA: Status: ACTIVE | Noted: 2023-11-08

## 2023-11-08 PROBLEM — G43.909 MIGRAINE: Status: ACTIVE | Noted: 2023-11-08

## 2023-11-08 PROBLEM — I73.9 PVD (PERIPHERAL VASCULAR DISEASE): Status: ACTIVE | Noted: 2023-11-08

## 2023-11-08 PROBLEM — T46.6X5A MYALGIA DUE TO STATIN: Status: ACTIVE | Noted: 2023-11-08

## 2023-11-08 PROCEDURE — 99406 PR TOBACCO USE CESSATION INTERMEDIATE 3-10 MINUTES: ICD-10-PCS | Mod: ,,, | Performed by: STUDENT IN AN ORGANIZED HEALTH CARE EDUCATION/TRAINING PROGRAM

## 2023-11-08 PROCEDURE — G0439 PR MEDICARE ANNUAL WELLNESS SUBSEQUENT VISIT: ICD-10-PCS | Mod: ,,, | Performed by: STUDENT IN AN ORGANIZED HEALTH CARE EDUCATION/TRAINING PROGRAM

## 2023-11-08 PROCEDURE — 99406 BEHAV CHNG SMOKING 3-10 MIN: CPT | Mod: ,,, | Performed by: STUDENT IN AN ORGANIZED HEALTH CARE EDUCATION/TRAINING PROGRAM

## 2023-11-08 PROCEDURE — G0439 PPPS, SUBSEQ VISIT: HCPCS | Mod: ,,, | Performed by: STUDENT IN AN ORGANIZED HEALTH CARE EDUCATION/TRAINING PROGRAM

## 2023-11-08 RX ORDER — DORZOLAMIDE HCL 20 MG/ML
1 SOLUTION/ DROPS OPHTHALMIC 2 TIMES DAILY
COMMUNITY
Start: 2023-10-02

## 2023-11-08 RX ORDER — METHAZOLAMIDE 50 MG/1
50 TABLET ORAL 3 TIMES DAILY
COMMUNITY
Start: 2023-08-16

## 2023-11-08 RX ORDER — FAMOTIDINE 20 MG/1
20 TABLET, FILM COATED ORAL DAILY
COMMUNITY

## 2023-11-08 RX ORDER — LATANOPROSTENE BUNOD 0.24 MG/ML
1 SOLUTION/ DROPS OPHTHALMIC
COMMUNITY
Start: 2023-05-18

## 2024-01-29 PROBLEM — Z00.00 WELLNESS EXAMINATION: Status: RESOLVED | Noted: 2022-11-07 | Resolved: 2024-01-29

## 2024-02-05 ENCOUNTER — TELEPHONE (OUTPATIENT)
Dept: PRIMARY CARE CLINIC | Facility: CLINIC | Age: 79
End: 2024-02-05
Payer: MEDICARE

## 2024-02-05 DIAGNOSIS — R52 PAIN: ICD-10-CM

## 2024-02-05 RX ORDER — BUTALBITAL, ACETAMINOPHEN AND CAFFEINE 50; 325; 40 MG/1; MG/1; MG/1
1 TABLET ORAL EVERY 6 HOURS PRN
Qty: 30 TABLET | Refills: 3 | Status: SHIPPED | OUTPATIENT
Start: 2024-02-05

## 2024-02-05 NOTE — TELEPHONE ENCOUNTER
----- Message from Milena Barraza sent at 2/5/2024  3:37 PM CST -----  Regarding: refill request  Type:  RX Refill Request    Who Called:  pt    Refill or New Rx: refill    RX Name and Strength: butalbital-acetaminophen-caffeine -40 mg (FIORICET, ESGIC) -40 mg per tablet    How is the patient currently taking it? (ex. 1XDay): four x day    Is this a 30 day or 90 day RX: 30    Preferred Pharmacy with phone number: [facility] [place]    Local or Mail Order: local    Ordering Provider: keyona    Would the patient rather a call back or a response via [facility]?  Yes if needed    Best Call Back Number: [phone]    Additional Information:  refill request, please advise, thanks

## 2024-07-23 ENCOUNTER — TELEPHONE (OUTPATIENT)
Dept: PRIMARY CARE CLINIC | Facility: CLINIC | Age: 79
End: 2024-07-23
Payer: MEDICARE

## 2024-07-23 DIAGNOSIS — R52 PAIN: ICD-10-CM

## 2024-07-23 RX ORDER — BUTALBITAL, ACETAMINOPHEN AND CAFFEINE 50; 325; 40 MG/1; MG/1; MG/1
1 TABLET ORAL EVERY 6 HOURS PRN
Qty: 30 TABLET | Refills: 3 | Status: SHIPPED | OUTPATIENT
Start: 2024-07-23

## 2024-07-23 NOTE — TELEPHONE ENCOUNTER
----- Message from Milena Barraza sent at 7/23/2024  2:57 PM CDT -----  Who Called: Ross Campos    Pharmacy is calling to request assistance with Rx    Pharmacy name and phone number: [facility] - [address]   Phone: [phone]  Fax: [phone]  Pharmacy contact:  joleen  Patient Name:  ross  Prescription Name: butalbital-acetaminophen-caffeine -40 mg (FIORICET, ESGIC) -40 mg per tablet 30 tablet   What do they need to clarify?: refill request    Preferred Method of Contact: Phone Call  Patient's Preferred Phone Number on File: [phone]   Best Call Back Number, if different:  Additional Information:  pharmacy call, refill request, please advise, thanks

## 2024-08-07 ENCOUNTER — PATIENT MESSAGE (OUTPATIENT)
Dept: ADMINISTRATIVE | Facility: OTHER | Age: 79
End: 2024-08-07
Payer: MEDICARE

## 2024-11-04 DIAGNOSIS — Z00.00 WELLNESS EXAMINATION: ICD-10-CM

## 2024-11-04 DIAGNOSIS — D64.9 ANEMIA, UNSPECIFIED TYPE: ICD-10-CM

## 2024-11-04 DIAGNOSIS — D64.9 ANEMIA, UNSPECIFIED TYPE: Primary | ICD-10-CM

## 2024-11-04 DIAGNOSIS — Z00.00 WELLNESS EXAMINATION: Primary | ICD-10-CM

## 2024-11-04 LAB
APPEARANCE UR: CLEAR
BACTERIA #/AREA URNS HPF: ABNORMAL /[HPF]
BASOPHILS # BLD AUTO: 0 X10E3/UL (ref 0–0.2)
BASOPHILS NFR BLD AUTO: 1 %
BILIRUB UR QL STRIP: NEGATIVE
COLOR UR: YELLOW
CRYSTALS URNS MICRO: ABNORMAL
EOSINOPHIL # BLD AUTO: 0.4 X10E3/UL (ref 0–0.4)
EOSINOPHIL NFR BLD AUTO: 6 %
EPI CELLS #/AREA URNS HPF: ABNORMAL /HPF (ref 0–10)
ERYTHROCYTE [DISTWIDTH] IN BLOOD BY AUTOMATED COUNT: 12.3 % (ref 11.6–15.4)
GLUCOSE UR QL STRIP: NEGATIVE
HCT VFR BLD AUTO: 39.6 % (ref 37.5–51)
HGB BLD-MCNC: 12.8 G/DL (ref 13–17.7)
HGB UR QL STRIP: NEGATIVE
IMM GRANULOCYTES NFR BLD AUTO: 0 %
KETONES UR QL STRIP: NEGATIVE
LEUKOCYTE ESTERASE UR QL STRIP: NEGATIVE
LYMPHOCYTES # BLD AUTO: 2.7 X10E3/UL (ref 0.7–3.1)
LYMPHOCYTES NFR BLD AUTO: 41 %
MCH RBC QN AUTO: 30.9 PG (ref 26.6–33)
MCHC RBC AUTO-ENTMCNC: 32.3 G/DL (ref 31.5–35.7)
MCV RBC AUTO: 96 FL (ref 79–97)
MICRO URNS: ABNORMAL
MONOCYTES # BLD AUTO: 0.9 X10E3/UL (ref 0.1–0.9)
MONOCYTES NFR BLD AUTO: 13 %
NEUTROPHILS # BLD AUTO: 2.6 X10E3/UL (ref 1.4–7)
NEUTROPHILS NFR BLD AUTO: 39 %
NITRITE UR QL STRIP: NEGATIVE
PH UR STRIP: 6 [PH] (ref 5–7.5)
PLATELET # BLD AUTO: 244 X10E3/UL (ref 150–450)
PROT UR QL STRIP: ABNORMAL
RBC # BLD AUTO: 4.14 X10E6/UL (ref 4.14–5.8)
RBC #/AREA URNS HPF: ABNORMAL /HPF (ref 0–2)
SP GR UR STRIP: 1.02 (ref 1–1.03)
UROBILINOGEN UR STRIP-MCNC: 0.2 MG/DL (ref 0.2–1)
WBC # BLD AUTO: 6.6 X10E3/UL (ref 3.4–10.8)
WBC #/AREA URNS HPF: ABNORMAL /HPF (ref 0–5)

## 2024-11-05 NOTE — ASSESSMENT & PLAN NOTE
Provided Fred Campos with a 5-10 year written screening schedule and personal prevention plan. Recommendations were developed using the USPSTF age appropriate recommendations. Education, counseling, and referrals were provided as needed. After Visit Summary printed and given to patient which includes a list of additional screenings\tests needed.

## 2024-11-05 NOTE — PROGRESS NOTES
Date: 11/11/2024  Patient ID: [id number]   Chief Complaint: Medicare AWV    HPI:   Fred Campos is a 78 y.o. male here today for a Medicare Wellness.     Opioid Screening: Patient medication list reviewed, patient is not taking prescription opioids. Patient is not using additional opioids than prescribed. Patient is at low risk of substance abuse based on this opioid use history.     Labs showed low Hb, low Na, high potassium, elevated LDL and total chol; Psa 3.2; wnl TFTs. Does have R superficial rib/chest pain/R axilla that started 1 day ago, worsens with touching. Denies trauma or inciting factor.   Diet: avoids fats; eats lean meats. Eats bananas regularly  Activity level: plays golf. Home exercises 20min 5d/wk  PSA: 3.2  CRC: last time wnl and no longer participating     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    Psoriasis    Elevated lipoprotein(a)    Myalgia due to statin    Tobacco use    PVD (peripheral vascular disease)    Valvular heart disease    Hiatal hernia    Migraine    Electrolyte abnormality    Normocytic anemia     Outpatient Medications Marked as Taking for the 11/11/24 encounter (Office Visit) with Ronna Shirley MD   Medication Sig Dispense Refill    butalbital-acetaminophen-caffeine -40 mg (FIORICET, ESGIC) -40 mg per tablet Take 1 tablet by mouth every 6 (six) hours as needed for Pain or Headaches. 30 tablet 3    famotidine (PEPCID) 20 MG tablet Take 20 mg by mouth Daily.      prednisoLONE acetate (PRED FORTE) 1 % DrpS Place 1 drop into the left eye 2 (two) times daily.       Past Medical History:   Diagnosis Date    Hiatal hernia 11/8/2023    HLD (hyperlipidemia)     Hyperkalemia 11/7/2022    Deescalate potassium intake with diet reassess levels at follow-up in consider the Lasix or Kayexalate if persistent elevation is noted    Migraine 11/8/2023    Myalgia due to statin 11/8/2023    PVD (peripheral vascular  disease) 11/8/2023    Tobacco use 11/8/2023    Valvular heart disease 11/8/2023     Past Surgical History:   Procedure Laterality Date    APPENDECTOMY      cataract surgery      DESTRUCTION, CILIARY BODY, USING LASER Left 3/12/2024    Procedure: IRIDEX - OS 4/15;  Surgeon: Lona Gutierrez MD;  Location: [facility] OR;  Service: Ophthalmology;  Laterality: Left;    IMPLANTATION OF DEVICE FOR GLAUCOMA Left 8/6/2024    Procedure: XEN- OS;  Surgeon: Lona Gutierrez MD;  Location: [facility] OR;  Service: Ophthalmology;  Laterality: Left;     Review of patient's allergies indicates:  No Known Allergies  No family history on file.   Social History     Socioeconomic History    Marital status:    Tobacco Use    Smoking status: Every Day     Current packs/day: 1.00     Types: Cigarettes    Smokeless tobacco: Never    Tobacco comments:     1ppd since 17y   Substance and Sexual Activity    Alcohol use: Yes     Comment: once at night    Drug use: Never    Sexual activity: Yes     Social Drivers of Health     Financial Resource Strain: Low Risk  (11/11/2024)    Overall Financial Resource Strain (CARDIA)     Difficulty of Paying Living Expenses: Not hard at all   Food Insecurity: No Food Insecurity (11/11/2024)    Hunger Vital Sign     Worried About Running Out of Food in the Last Year: Never true     Ran Out of Food in the Last Year: Never true   Transportation Needs: No Transportation Needs (11/11/2024)    TRANSPORTATION NEEDS     Transportation : No   Physical Activity: Inactive (11/11/2024)    Exercise Vital Sign     Days of Exercise per Week: 0 days     Minutes of Exercise per Session: 0 min   Stress: No Stress Concern Present (11/11/2024)    [employer] - Occupational Stress Questionnaire     Feeling of Stress : Not at all   Housing Stability: Low Risk  (11/11/2024)    Housing Stability Vital Sign     Unable to Pay for Housing in the Last Year: No     Homeless in the Last Year: No     Patient  Care Team:  Ronna Shirley MD as PCP - General (Family Medicine)  Stef Syed MD as Consulting Physician (Cardiology)  Lona Gutierrez MD as Consulting Physician (Ophthalmology)   Subjective:   Review of Systems   Constitutional:  Negative for fever and weight loss.   HENT:  Negative for congestion, hearing loss and sore throat.    Eyes:  Negative for blurred vision and double vision.   Respiratory:  Negative for cough and shortness of breath.    Cardiovascular:  Negative for chest pain and palpitations.   Gastrointestinal:  Negative for abdominal pain and diarrhea.   Genitourinary:  Negative for dysuria, frequency, hematuria and urgency.   Musculoskeletal:  Negative for falls.   Skin:  Negative for rash.   Psychiatric/Behavioral:  Negative for depression and memory loss. The patient is not nervous/anxious and does not have insomnia.      See HPI for details  All Other ROS: Negative except as stated in HPI  Patient Reported Health Risk Assessment  What [age] 70-79  Are you male or female?: Male  During the past four weeks, how much have you been bothered by emotional problems such as feeling anxious, depressed, irritable, sad, or downhearted and blue?: Not at all  During the past five weeks, has your physical and/or emotional health limited your social activities with family, friends, neighbors, or groups?: Not at all  During the past four weeks, how much bodily pain have you generally had?: Very mild pain  During the past four weeks, was someone available to help if you needed and wanted help?: Yes, as much as I wanted  During the past four weeks, what was the hardest physical activity you could do for at least two minutes?: Moderate  Can you get to places out of walking distance without help?  (For example, can you travel alone on buses or taxis, or drive your own car?): Yes  Can you go shopping for groceries or clothes without someone's help?: Yes  Can you prepare your own meals?: Yes  Can you do  "your own housework without help?: Yes  Because of any health problems, do you need the help of another person with your personal care needs such as eating, bathing, dressing, or getting around the house?: No  Can you handle your own money without help?: Yes  During the past four weeks, how would you rate your health in general?: Good  How have things been going for you during the past four weeks?: Pretty well  Are you having difficulties driving your car?: No  Do you always fasten your seat belt when you are in a car?: Yes, usually  How often in the past four weeks have you been bothered by falling or dizzy when standing up?: Never  How often in the past four weeks have you been bothered by sexual problems?: Never  How often in the past four weeks have you been bothered by trouble eating well?: Never  How often in the past four weeks have you been bothered by teeth or denture problems?: Never  How often in the past four weeks have you been bothered with problems using the telephone?: Never  How often in the past four weeks have you been bothered by tiredness or fatigue?: Never  Have you fallen two or more times in the past year?: No  Are you afraid of falling?: No  Are you a smoker?: No  During the past four weeks, how many drinks of wine, beer, or other alcoholic beverages did you have?: No alcohol at all  Do you exercise for about 20 minutes three or more days a week?: No, I usually do not exercise this much  Have you been given any information to help you with hazards in your house that might hurt you?: Yes  Have you been given any information to help you with keeping track of your medications?: Yes  How often do you have trouble taking medicines the way you've been told to take them?: I always take them as prescribed  How confident are you that you can control and manage most of your health problems?: Very confident  Objective:   /64   Pulse 85   Ht 5' 9" (1.753 m)   Wt 76.7 kg (169 lb)   SpO2 (!) 93%  "  BMI 24.96 kg/m²   Physical Exam  Constitutional:       Appearance: Normal appearance.   HENT:      Head: Normocephalic and atraumatic.      Right Ear: Tympanic membrane, ear canal and external ear normal.      Left Ear: Tympanic membrane, ear canal and external ear normal.      Nose: No congestion or rhinorrhea.      Mouth/Throat:      Mouth: Mucous membranes are moist.      Pharynx: No oropharyngeal exudate or posterior oropharyngeal erythema.   Eyes:      General: No scleral icterus.        Right eye: No discharge.         Left eye: No discharge.      Extraocular Movements: Extraocular movements intact.      Conjunctiva/sclera: Conjunctivae normal.      Comments: L cornea with irregular shape   Cardiovascular:      Rate and Rhythm: Normal rate and regular rhythm.      Pulses: Normal pulses.      Heart sounds: Normal heart sounds.      Comments: Grade 2/6 sytolic murmur  Pulmonary:      Effort: Pulmonary effort is normal.      Breath sounds: Wheezing (denies SOB or meds at this time) present.   Abdominal:      General: Abdomen is flat. Bowel sounds are normal.      Palpations: Abdomen is soft.   Musculoskeletal:         General: No deformity. Normal range of motion.      Cervical back: Normal range of motion and neck supple.   Skin:     General: Skin is warm and dry.   Neurological:      Mental Status: He is alert and oriented to person, place, and time.   Psychiatric:         Mood and Affect: Mood normal.         Behavior: Behavior normal.         Thought Content: Thought content normal.         Judgment: Judgment normal.            No data to display                  11/11/2024     1:00 PM 11/8/2023     1:00 PM 11/7/2022     2:00 PM   Fall Risk Assessment - Outpatient   Mobility Status Ambulatory Ambulatory Ambulatory   Number of falls 0 0 0   Identified as fall risk False False False           Depression Screening  Over the past two weeks, has the patient felt down, depressed, or hopeless?: No  Over the past  two weeks, has the patient felt little interest or pleasure in doing things?: No  Functional Ability/Safety Screening  Was the patient's timed Up & Go test unsteady or longer than 30 seconds?: No  Does the patient need help with phone, transportation, shopping, preparing meals, housework, laundry, meds, or managing money?: No  Does the patient's home have rugs in the hallway, lack grab bars in the bathroom, lack handrails on the stairs or have poor lighting?: No  Have you noticed any hearing difficulties?: No  Cognitive Function (Assessed through direct observation with due consideration of information obtained by way of patient reports and/or concerns raised by family, friends, caretakers, or others)    Does the patient repeat questions/statements in the same day?: No  Does the patient have trouble remembering the date, year, and time?: No  Does the patient have difficulty managing finances?: No  Does the patient have a decreased sense of direction?: No  Assessment:       ICD-10-CM ICD-9-CM   1. Wellness examination  Z00.00 V70.0   2. Electrolyte abnormality  E87.8 276.9   3. Normocytic anemia  D64.9 285.9   4. PVD (peripheral vascular disease)  I73.9 443.9      Plan:   1. Wellness examination  Assessment & Plan:  Provided Fred Campos with a 5-10 year written screening schedule and personal prevention plan. Recommendations were developed using the USPSTF age appropriate recommendations. Education, counseling, and referrals were provided as needed. After Visit Summary printed and given to patient which includes a list of additional screenings\tests needed.         2. Electrolyte abnormality  Assessment & Plan:  Decrease potassium rich foods  Continue to monitor with routine labs  If abnormal/worsen, consider further evaluation      Orders:  -     Comprehensive Metabolic Panel; Future; Expected date: 11/11/2024    3. Normocytic anemia  Assessment & Plan:  Further anemia studies.   Nutrient rich diet (I.e., red  meat, legumes, green/leafy vegetables, etc.)  Consider starting po supplement  Consider referral for endoscopy      Orders:  -     Iron and TIBC; Future; Expected date: 11/11/2024  -     Ferritin; Future; Expected date: 11/11/2024  -     Reticulocytes; Future; Expected date: 11/11/2024  -     Folate; Future; Expected date: 11/11/2024  -     Vitamin B12; Future; Expected date: 11/11/2024  -     Path Review, Peripheral Smear; Future; Expected date: 11/11/2024    4. PVD (peripheral vascular disease)  Overview:  L, wears compression socks           Medicare Annual Wellness and Personalized Prevention Plan:   Fall Risk + Home Safety + Hearing Impairment + Depression Screen + Opioid and Substance Abuse Screening + Cognitive Impairment Screen + Health Risk Assessment all reviewed.     Health Maintenance Topics with due status: Not Due       Topic Last Completion Date    Lipid Panel 11/04/2024      The patient's Health Maintenance was reviewed and the following appears to be due at this time:   Health Maintenance Due   Topic Date Due    Hepatitis C Screening  Never done    TETANUS VACCINE  Never done    Shingles Vaccine (1 of 2) Never done    RSV Vaccine (Age 60+ and Pregnant patients) (1 - 1-dose 75+ series) Never done    Influenza Vaccine (1) Never done    COVID-19 Vaccine (3 - 2024-25 season) 09/01/2024       Advance Care Planning     Date: 10/30/2023    Living Will, POA           Follow up in about 2 weeks (around 11/25/2024) for Anemia, electrolyte abnormality with labs, telemed; [facility]. In addition to their scheduled follow up, the patient has also been instructed to follow up on as needed basis.

## 2024-11-11 ENCOUNTER — OFFICE VISIT (OUTPATIENT)
Dept: PRIMARY CARE CLINIC | Facility: CLINIC | Age: 79
End: 2024-11-11
Payer: MEDICARE

## 2024-11-11 VITALS
HEIGHT: 69 IN | DIASTOLIC BLOOD PRESSURE: 64 MMHG | OXYGEN SATURATION: 93 % | HEART RATE: 85 BPM | WEIGHT: 169 LBS | BODY MASS INDEX: 25.03 KG/M2 | SYSTOLIC BLOOD PRESSURE: 130 MMHG

## 2024-11-11 DIAGNOSIS — Z00.00 WELLNESS EXAMINATION: Primary | ICD-10-CM

## 2024-11-11 DIAGNOSIS — D64.9 NORMOCYTIC ANEMIA: ICD-10-CM

## 2024-11-11 DIAGNOSIS — I73.9 PVD (PERIPHERAL VASCULAR DISEASE): ICD-10-CM

## 2024-11-11 DIAGNOSIS — E87.8 ELECTROLYTE ABNORMALITY: ICD-10-CM

## 2024-11-11 PROBLEM — R03.0 ELEVATED BLOOD PRESSURE READING: Status: RESOLVED | Noted: 2023-11-08 | Resolved: 2024-11-11

## 2024-11-11 PROCEDURE — G0439 PPPS, SUBSEQ VISIT: HCPCS | Mod: ,,, | Performed by: STUDENT IN AN ORGANIZED HEALTH CARE EDUCATION/TRAINING PROGRAM

## 2024-11-11 RX ORDER — PREDNISOLONE ACETATE 10 MG/ML
1 SUSPENSION/ DROPS OPHTHALMIC 2 TIMES DAILY
COMMUNITY
Start: 2024-09-30

## 2024-11-11 NOTE — ASSESSMENT & PLAN NOTE
Decrease potassium rich foods  Continue to monitor with routine labs  If abnormal/worsen, consider further evaluation

## 2024-11-19 NOTE — PROGRESS NOTES
Established Patient - Audio Only Telehealth Visit     The patient location is: at home  The chief complaint leading to consultation is: Follow-up (Lab review)    Visit type: Virtual visit with audio only (telephone)  Total time spent with patient: 5-10 minutes       The reason for the audio only service rather than synchronous audio and video virtual visit was related to technical difficulties or patient preference/necessity.     Each patient to whom I provide medical services by telemedicine is:  (1) informed of the relationship between the physician and patient and the respective role of any other health care provider with respect to management of the patient; and (2) notified that they may decline to receive medical services by telemedicine and may withdraw from such care at any time. Patient verbally consented to receive this service via voice-only telephone call.       HPI: Patient was to decrease potassium rich foods for elevated potassium levels at last visit. Iron studies were ordered for anemia, which were wnl. Recent labs wnl except mildly decreased sodium.  anemia  Did note ringing and hearing loss in L ear since last visit. Did have congestion as well. Is taking mucinex and allegra. Will try flonase though      Answers submitted by the patient for this visit:  Review of Systems Questionnaire (Submitted on 11/22/2024)  activity change: No  unexpected weight change: No  neck pain: No  hearing loss: Yes  rhinorrhea: No  trouble swallowing: No  eye discharge: No  visual disturbance: No  chest tightness: No  wheezing: No  chest pain: No  palpitations: No  blood in stool: No  constipation: No  vomiting: No  diarrhea: No  polydipsia: No  polyuria: No  difficulty urinating: No  urgency: No  hematuria: No  joint swelling: No  arthralgias: No  headaches: Yes  weakness: No  confusion: No  dysphoric mood: No     Assessment and plan:    1. Normocytic anemia  Overview:  Anemia panel wnl    Assessment & Plan:  Continue  to monitor with routine labs  If abnormal/worsen, consider further evaluation        2. Electrolyte abnormality  Assessment & Plan:  Improved  Continue decreasing potassium rich foods  Continue to monitor      3. Tinnitus of left ear  Assessment & Plan:  Attempt flonase  Cont allegra, mucinex.   Consider ent referral if progresses.           Medication List with Changes/Refills   Current Medications    BUTALBITAL-ACETAMINOPHEN-CAFFEINE -40 MG (FIORICET, ESGIC) -40 MG PER TABLET    Take 1 tablet by mouth every 6 (six) hours as needed for Pain or Headaches.       Start Date: 2024 End Date: --    DORZOLAMIDE (TRUSOPT) 2 % OPHTHALMIC SOLUTION    Place 1 drop into the left eye 2 (two) times daily.       Start Date: 10/2/2023 End Date: --    DORZOLAMIDE-TIMOLOL 2-0.5% (COSOPT) 22.3-6.8 MG/ML OPHTHALMIC SOLUTION    SMARTSI Drop(s) Left Eye Morning-Night       Start Date: 2022 End Date: --    FAMOTIDINE (PEPCID) 20 MG TABLET    Take 20 mg by mouth Daily.       Start Date: --        End Date: --    METHAZOLAMIDE (NEPTAZANE) 50 MG TAB    Take 50 mg by mouth 3 (three) times daily.       Start Date: 2023 End Date: --    PRAVASTATIN (PRAVACHOL) 10 MG TABLET    Take 1 tablet (10 mg total) by mouth every other day.       Start Date: 2022 End Date: --    PREDNISOLONE ACETATE (PRED FORTE) 1 % DRPS    Place 1 drop into the left eye 2 (two) times daily.       Start Date: 2024 End Date: --    VYZULTA 0.024 % DROP    Apply 1 drop to eye.       Start Date: 2023 End Date: --        Follow up in about 1 year (around 2025) for Medicare Wellness. In addition to their scheduled follow up, the patient has also been instructed to follow up on as needed basis.   This service was not originating from a related E/M service provided within the previous 7 days nor will  to an E/M service or procedure within the next 24 hours or my soonest available appointment.  Prevailing standard of care  was able to be met in this audio-only visit.

## 2024-11-25 ENCOUNTER — OFFICE VISIT (OUTPATIENT)
Dept: PRIMARY CARE CLINIC | Facility: CLINIC | Age: 79
End: 2024-11-25
Payer: MEDICARE

## 2024-11-25 DIAGNOSIS — E78.41 ELEVATED LIPOPROTEIN(A): ICD-10-CM

## 2024-11-25 DIAGNOSIS — E87.8 ELECTROLYTE ABNORMALITY: ICD-10-CM

## 2024-11-25 DIAGNOSIS — H93.12 TINNITUS OF LEFT EAR: ICD-10-CM

## 2024-11-25 DIAGNOSIS — Z12.5 SCREENING FOR MALIGNANT NEOPLASM OF PROSTATE: ICD-10-CM

## 2024-11-25 DIAGNOSIS — D64.9 NORMOCYTIC ANEMIA: Primary | ICD-10-CM

## 2024-11-25 PROCEDURE — 99441 PR PHYSICIAN TELEPHONE EVALUATION 5-10 MIN: CPT | Mod: 95,,, | Performed by: STUDENT IN AN ORGANIZED HEALTH CARE EDUCATION/TRAINING PROGRAM

## 2025-01-06 ENCOUNTER — TELEPHONE (OUTPATIENT)
Dept: FAMILY MEDICINE | Facility: CLINIC | Age: 80
End: 2025-01-06
Payer: MEDICARE

## 2025-01-06 DIAGNOSIS — H93.12 TINNITUS OF LEFT EAR: Primary | ICD-10-CM

## 2025-01-06 NOTE — TELEPHONE ENCOUNTER
----- Message from Med Assistant Carpenter sent at 1/6/2025  7:23 AM CST -----  Please place ENT referral  ----- Message -----  From: Amada Noguera  Sent: 12/30/2024   2:21 PM CST  To: [facility] Staff    .Who Called: Fred Campos        Preferred Method of Contact: Phone Call  Patient's Preferred Phone Number on File: [phone]   Best Call Back Number, if different:  Additional Information: pt wants referral sent to ENT for Dr.Tyne Tan ph [phone] please call pt so he can call them

## 2025-05-01 ENCOUNTER — HOSPITAL ENCOUNTER (INPATIENT)
Facility: HOSPITAL | Age: 80
LOS: 2 days | Discharge: HOME | DRG: 641 | End: 2025-05-03
Attending: STUDENT IN AN ORGANIZED HEALTH CARE EDUCATION/TRAINING PROGRAM | Admitting: STUDENT IN AN ORGANIZED HEALTH CARE EDUCATION/TRAINING PROGRAM
Payer: MEDICARE

## 2025-05-01 DIAGNOSIS — R06.02 SHORTNESS OF BREATH: ICD-10-CM

## 2025-05-01 DIAGNOSIS — R07.9 CHEST PAIN: ICD-10-CM

## 2025-05-01 DIAGNOSIS — R42 DIZZINESS: ICD-10-CM

## 2025-05-01 DIAGNOSIS — G93.89 BRAIN MASS: Primary | ICD-10-CM

## 2025-05-01 DIAGNOSIS — R00.1 BRADYCARDIA: ICD-10-CM

## 2025-05-01 PROBLEM — F17.200 TOBACCO DEPENDENCY: Status: ACTIVE | Noted: 2025-05-01

## 2025-05-01 LAB
ALBUMIN SERPL-MCNC: 4.2 G/DL (ref 3.4–4.8)
ALBUMIN/GLOB SERPL: 1.3 RATIO (ref 1.1–2)
ALP SERPL-CCNC: 101 UNIT/L (ref 40–150)
ALT SERPL-CCNC: 15 UNIT/L (ref 0–55)
AMORPH URATE CRY URNS QL MICRO: ABNORMAL /UL
ANION GAP SERPL CALC-SCNC: 11 MEQ/L
APTT PPP: 31 SECONDS (ref 23.2–33.7)
AST SERPL-CCNC: 19 UNIT/L (ref 11–45)
BACTERIA #/AREA URNS AUTO: ABNORMAL /HPF
BASOPHILS # BLD AUTO: 0.06 X10(3)/MCL
BASOPHILS NFR BLD AUTO: 0.4 %
BILIRUB SERPL-MCNC: 0.2 MG/DL
BILIRUB UR QL STRIP.AUTO: NEGATIVE
BNP BLD-MCNC: 12.8 PG/ML
BUN SERPL-MCNC: 17.5 MG/DL (ref 8.4–25.7)
CALCIUM SERPL-MCNC: 9.2 MG/DL (ref 8.8–10)
CHLORIDE SERPL-SCNC: 96 MMOL/L (ref 98–107)
CLARITY UR: ABNORMAL
CO2 SERPL-SCNC: 26 MMOL/L (ref 23–31)
COLOR UR AUTO: YELLOW
CREAT SERPL-MCNC: 0.95 MG/DL (ref 0.72–1.25)
CREAT/UREA NIT SERPL: 18
EOSINOPHIL # BLD AUTO: 0.14 X10(3)/MCL (ref 0–0.9)
EOSINOPHIL NFR BLD AUTO: 1 %
ERYTHROCYTE [DISTWIDTH] IN BLOOD BY AUTOMATED COUNT: 12.5 % (ref 11.5–17)
FLUAV AG UPPER RESP QL IA.RAPID: NOT DETECTED
FLUBV AG UPPER RESP QL IA.RAPID: NOT DETECTED
GFR SERPLBLD CREATININE-BSD FMLA CKD-EPI: >60 ML/MIN/1.73/M2
GLOBULIN SER-MCNC: 3.2 GM/DL (ref 2.4–3.5)
GLUCOSE SERPL-MCNC: 152 MG/DL (ref 82–115)
GLUCOSE UR QL STRIP: NORMAL
HCT VFR BLD AUTO: 40.6 % (ref 42–52)
HGB BLD-MCNC: 13.2 G/DL (ref 14–18)
HGB UR QL STRIP: NEGATIVE
IMM GRANULOCYTES # BLD AUTO: 0.07 X10(3)/MCL (ref 0–0.04)
IMM GRANULOCYTES NFR BLD AUTO: 0.5 %
INR PPP: 0.9
KETONES UR QL STRIP: NEGATIVE
LEUKOCYTE ESTERASE UR QL STRIP: NEGATIVE
LYMPHOCYTES # BLD AUTO: 2.96 X10(3)/MCL (ref 0.6–4.6)
LYMPHOCYTES NFR BLD AUTO: 21.3 %
MAGNESIUM SERPL-MCNC: 1.8 MG/DL (ref 1.6–2.6)
MCH RBC QN AUTO: 30.9 PG (ref 27–31)
MCHC RBC AUTO-ENTMCNC: 32.5 G/DL (ref 33–36)
MCV RBC AUTO: 95.1 FL (ref 80–94)
MONOCYTES # BLD AUTO: 1.32 X10(3)/MCL (ref 0.1–1.3)
MONOCYTES NFR BLD AUTO: 9.5 %
NEUTROPHILS # BLD AUTO: 9.32 X10(3)/MCL (ref 2.1–9.2)
NEUTROPHILS NFR BLD AUTO: 67.3 %
NITRITE UR QL STRIP: NEGATIVE
NRBC BLD AUTO-RTO: 0 %
OHS QRS DURATION: 102 MS
OHS QTC CALCULATION: 467 MS
PH UR STRIP: 7 [PH]
PLATELET # BLD AUTO: 220 X10(3)/MCL (ref 130–400)
PMV BLD AUTO: 9.1 FL (ref 7.4–10.4)
POCT GLUCOSE: 147 MG/DL (ref 70–110)
POTASSIUM SERPL-SCNC: 4.6 MMOL/L (ref 3.5–5.1)
PROT SERPL-MCNC: 7.4 GM/DL (ref 5.8–7.6)
PROT UR QL STRIP: ABNORMAL
PROTHROMBIN TIME: 12.3 SECONDS (ref 12.5–14.5)
RBC # BLD AUTO: 4.27 X10(6)/MCL (ref 4.7–6.1)
RBC #/AREA URNS AUTO: ABNORMAL /HPF
RSV A 5' UTR RNA NPH QL NAA+PROBE: NOT DETECTED
SARS-COV-2 RNA RESP QL NAA+PROBE: NOT DETECTED
SODIUM SERPL-SCNC: 133 MMOL/L (ref 136–145)
SP GR UR STRIP.AUTO: 1.02 (ref 1–1.03)
SQUAMOUS #/AREA URNS LPF: ABNORMAL /HPF
TROPONIN I SERPL-MCNC: <0.01 NG/ML (ref 0–0.04)
UROBILINOGEN UR STRIP-ACNC: NORMAL
WBC # BLD AUTO: 13.87 X10(3)/MCL (ref 4.5–11.5)
WBC #/AREA URNS AUTO: ABNORMAL /HPF

## 2025-05-01 PROCEDURE — 11000001 HC ACUTE MED/SURG PRIVATE ROOM

## 2025-05-01 PROCEDURE — 83735 ASSAY OF MAGNESIUM: CPT | Performed by: PHYSICIAN ASSISTANT

## 2025-05-01 PROCEDURE — 82962 GLUCOSE BLOOD TEST: CPT

## 2025-05-01 PROCEDURE — 85025 COMPLETE CBC W/AUTO DIFF WBC: CPT | Performed by: PHYSICIAN ASSISTANT

## 2025-05-01 PROCEDURE — 25000003 PHARM REV CODE 250: Performed by: PHYSICIAN ASSISTANT

## 2025-05-01 PROCEDURE — 99285 EMERGENCY DEPT VISIT HI MDM: CPT | Mod: 25

## 2025-05-01 PROCEDURE — 83880 ASSAY OF NATRIURETIC PEPTIDE: CPT | Performed by: PHYSICIAN ASSISTANT

## 2025-05-01 PROCEDURE — 93010 ELECTROCARDIOGRAM REPORT: CPT | Mod: ,,, | Performed by: INTERNAL MEDICINE

## 2025-05-01 PROCEDURE — 0241U COVID/RSV/FLU A&B PCR: CPT | Performed by: STUDENT IN AN ORGANIZED HEALTH CARE EDUCATION/TRAINING PROGRAM

## 2025-05-01 PROCEDURE — 96374 THER/PROPH/DIAG INJ IV PUSH: CPT

## 2025-05-01 PROCEDURE — 81001 URINALYSIS AUTO W/SCOPE: CPT | Performed by: PHYSICIAN ASSISTANT

## 2025-05-01 PROCEDURE — S4991 NICOTINE PATCH NONLEGEND: HCPCS | Performed by: NURSE PRACTITIONER

## 2025-05-01 PROCEDURE — 85730 THROMBOPLASTIN TIME PARTIAL: CPT | Performed by: PHYSICIAN ASSISTANT

## 2025-05-01 PROCEDURE — 25500020 PHARM REV CODE 255: Performed by: STUDENT IN AN ORGANIZED HEALTH CARE EDUCATION/TRAINING PROGRAM

## 2025-05-01 PROCEDURE — 63600175 PHARM REV CODE 636 W HCPCS: Performed by: PHYSICIAN ASSISTANT

## 2025-05-01 PROCEDURE — 93010 ELECTROCARDIOGRAM REPORT: CPT | Mod: 76,,, | Performed by: INTERNAL MEDICINE

## 2025-05-01 PROCEDURE — 84484 ASSAY OF TROPONIN QUANT: CPT | Performed by: PHYSICIAN ASSISTANT

## 2025-05-01 PROCEDURE — A9577 INJ MULTIHANCE: HCPCS | Performed by: PHYSICIAN ASSISTANT

## 2025-05-01 PROCEDURE — 25000003 PHARM REV CODE 250: Performed by: NURSE PRACTITIONER

## 2025-05-01 PROCEDURE — 85610 PROTHROMBIN TIME: CPT | Performed by: PHYSICIAN ASSISTANT

## 2025-05-01 PROCEDURE — 93005 ELECTROCARDIOGRAM TRACING: CPT

## 2025-05-01 PROCEDURE — 96361 HYDRATE IV INFUSION ADD-ON: CPT

## 2025-05-01 PROCEDURE — 96375 TX/PRO/DX INJ NEW DRUG ADDON: CPT

## 2025-05-01 PROCEDURE — 25500020 PHARM REV CODE 255: Performed by: PHYSICIAN ASSISTANT

## 2025-05-01 PROCEDURE — 80053 COMPREHEN METABOLIC PANEL: CPT | Performed by: PHYSICIAN ASSISTANT

## 2025-05-01 RX ORDER — GLUCAGON 1 MG
1 KIT INJECTION
Status: DISCONTINUED | OUTPATIENT
Start: 2025-05-01 | End: 2025-05-03 | Stop reason: HOSPADM

## 2025-05-01 RX ORDER — ALUMINUM HYDROXIDE, MAGNESIUM HYDROXIDE, AND SIMETHICONE 1200; 120; 1200 MG/30ML; MG/30ML; MG/30ML
30 SUSPENSION ORAL 4 TIMES DAILY PRN
Status: DISCONTINUED | OUTPATIENT
Start: 2025-05-01 | End: 2025-05-03 | Stop reason: HOSPADM

## 2025-05-01 RX ORDER — PROCHLORPERAZINE EDISYLATE 5 MG/ML
5 INJECTION INTRAMUSCULAR; INTRAVENOUS EVERY 6 HOURS PRN
Status: DISCONTINUED | OUTPATIENT
Start: 2025-05-01 | End: 2025-05-03 | Stop reason: HOSPADM

## 2025-05-01 RX ORDER — ACETAMINOPHEN 500 MG
1000 TABLET ORAL EVERY 6 HOURS PRN
Status: DISCONTINUED | OUTPATIENT
Start: 2025-05-01 | End: 2025-05-02

## 2025-05-01 RX ORDER — TALC
6 POWDER (GRAM) TOPICAL NIGHTLY PRN
Status: DISCONTINUED | OUTPATIENT
Start: 2025-05-01 | End: 2025-05-03 | Stop reason: HOSPADM

## 2025-05-01 RX ORDER — SODIUM CHLORIDE 0.9 % (FLUSH) 0.9 %
10 SYRINGE (ML) INJECTION
Status: DISCONTINUED | OUTPATIENT
Start: 2025-05-01 | End: 2025-05-03 | Stop reason: HOSPADM

## 2025-05-01 RX ORDER — BISACODYL 10 MG/1
10 SUPPOSITORY RECTAL DAILY PRN
Status: DISCONTINUED | OUTPATIENT
Start: 2025-05-01 | End: 2025-05-03 | Stop reason: HOSPADM

## 2025-05-01 RX ORDER — ONDANSETRON HYDROCHLORIDE 2 MG/ML
4 INJECTION, SOLUTION INTRAVENOUS EVERY 4 HOURS PRN
Status: DISCONTINUED | OUTPATIENT
Start: 2025-05-01 | End: 2025-05-03 | Stop reason: HOSPADM

## 2025-05-01 RX ORDER — IBUPROFEN 200 MG
16 TABLET ORAL
Status: DISCONTINUED | OUTPATIENT
Start: 2025-05-01 | End: 2025-05-03 | Stop reason: HOSPADM

## 2025-05-01 RX ORDER — AMOXICILLIN 250 MG
1 CAPSULE ORAL 2 TIMES DAILY PRN
Status: DISCONTINUED | OUTPATIENT
Start: 2025-05-01 | End: 2025-05-03 | Stop reason: HOSPADM

## 2025-05-01 RX ORDER — IBUPROFEN 200 MG
24 TABLET ORAL
Status: DISCONTINUED | OUTPATIENT
Start: 2025-05-01 | End: 2025-05-03 | Stop reason: HOSPADM

## 2025-05-01 RX ORDER — ACETAMINOPHEN 10 MG/ML
1000 INJECTION, SOLUTION INTRAVENOUS ONCE
Status: COMPLETED | OUTPATIENT
Start: 2025-05-01 | End: 2025-05-01

## 2025-05-01 RX ORDER — ONDANSETRON HYDROCHLORIDE 2 MG/ML
4 INJECTION, SOLUTION INTRAVENOUS
Status: COMPLETED | OUTPATIENT
Start: 2025-05-01 | End: 2025-05-01

## 2025-05-01 RX ORDER — IBUPROFEN 200 MG
1 TABLET ORAL DAILY
Status: DISCONTINUED | OUTPATIENT
Start: 2025-05-01 | End: 2025-05-03 | Stop reason: HOSPADM

## 2025-05-01 RX ORDER — NALOXONE HCL 0.4 MG/ML
0.02 VIAL (ML) INJECTION
Status: DISCONTINUED | OUTPATIENT
Start: 2025-05-01 | End: 2025-05-03 | Stop reason: HOSPADM

## 2025-05-01 RX ADMIN — ACETAMINOPHEN 1000 MG: 10 INJECTION, SOLUTION INTRAVENOUS at 06:05

## 2025-05-01 RX ADMIN — NICOTINE 1 PATCH: 14 PATCH TRANSDERMAL at 10:05

## 2025-05-01 RX ADMIN — IOHEXOL 58 ML: 350 INJECTION, SOLUTION INTRAVENOUS at 11:05

## 2025-05-01 RX ADMIN — GADOBENATE DIMEGLUMINE 15 ML: 529 INJECTION, SOLUTION INTRAVENOUS at 08:05

## 2025-05-01 RX ADMIN — SODIUM CHLORIDE 1000 ML: 9 INJECTION, SOLUTION INTRAVENOUS at 06:05

## 2025-05-01 RX ADMIN — ONDANSETRON 4 MG: 2 INJECTION INTRAMUSCULAR; INTRAVENOUS at 06:05

## 2025-05-01 NOTE — FIRST PROVIDER EVALUATION
"Medical screening examination initiated.  I have conducted a focused provider triage encounter, findings are as follows:    Brief history of present illness:  79-year-old male presents to ED for evaluation of dizziness starting at approximately 1:00 a.m. this morning.  Reports having nausea and vomiting starting this afternoon.  Has a history of vertigo.    Vitals:    05/01/25 1707   BP: (!) 185/81   BP Location: Left arm   Pulse: 88   Resp: 20   Temp: 97.5 °F (36.4 °C)   TempSrc: Oral   SpO2: (!) 89%   Weight: 74.8 kg (165 lb)   Height: 5' 9" (1.753 m)       Pertinent physical exam:  Patient awake and alert sitting in wheelchair.    Brief workup plan:  Labs, IVF, EKG, CT head    Preliminary workup initiated; this workup will be continued and followed by the physician or advanced practice provider that is assigned to the patient when roomed.  "

## 2025-05-01 NOTE — ED NOTES
Patient had episode of rhythm change on the monitor. See media tab for cardiac monitor strip. MD aware.

## 2025-05-02 PROBLEM — G93.89 BRAIN MASS: Status: ACTIVE | Noted: 2025-05-02

## 2025-05-02 LAB
ALBUMIN SERPL-MCNC: 3.8 G/DL (ref 3.4–4.8)
ALBUMIN/GLOB SERPL: 1.3 RATIO (ref 1.1–2)
ALP SERPL-CCNC: 90 UNIT/L (ref 40–150)
ALT SERPL-CCNC: 14 UNIT/L (ref 0–55)
ANION GAP SERPL CALC-SCNC: 5 MEQ/L
APICAL FOUR CHAMBER EJECTION FRACTION: 61 %
APICAL TWO CHAMBER EJECTION FRACTION: 61 %
AST SERPL-CCNC: 19 UNIT/L (ref 11–45)
AV INDEX (PROSTH): 0.33
AV MEAN GRADIENT: 19 MMHG
AV PEAK GRADIENT: 36 MMHG
AV VALVE AREA BY VELOCITY RATIO: 1 CM²
AV VALVE AREA: 1 CM²
AV VELOCITY RATIO: 0.33
B PERT.PT PRMT NPH QL NAA+NON-PROBE: NOT DETECTED
BASOPHILS # BLD AUTO: 0.03 X10(3)/MCL
BASOPHILS NFR BLD AUTO: 0.4 %
BILIRUB SERPL-MCNC: 0.3 MG/DL
BSA FOR ECHO PROCEDURE: 1.91 M2
BUN SERPL-MCNC: 14 MG/DL (ref 8.4–25.7)
C PNEUM DNA NPH QL NAA+NON-PROBE: NOT DETECTED
CALCIUM SERPL-MCNC: 8.7 MG/DL (ref 8.8–10)
CHLORIDE SERPL-SCNC: 100 MMOL/L (ref 98–107)
CO2 SERPL-SCNC: 29 MMOL/L (ref 23–31)
CREAT SERPL-MCNC: 0.87 MG/DL (ref 0.72–1.25)
CREAT/UREA NIT SERPL: 16
CV ECHO LV RWT: 0.46 CM
DOP CALC AO PEAK VEL: 3 M/S
DOP CALC AO VTI: 80.2 CM
DOP CALC LVOT AREA: 3.1 CM2
DOP CALC LVOT DIAMETER: 2 CM
DOP CALC LVOT PEAK VEL: 1 M/S
DOP CALC LVOT STROKE VOLUME: 82.6 CM3
DOP CALC MV VTI: 48.1 CM
DOP CALCLVOT PEAK VEL VTI: 26.3 CM
E WAVE DECELERATION TIME: 214 MSEC
E/A RATIO: 1.13
E/E' RATIO: 13 M/S
ECHO LV POSTERIOR WALL: 1.2 CM (ref 0.6–1.1)
EOSINOPHIL # BLD AUTO: 0.24 X10(3)/MCL (ref 0–0.9)
EOSINOPHIL NFR BLD AUTO: 3.4 %
ERYTHROCYTE [DISTWIDTH] IN BLOOD BY AUTOMATED COUNT: 12.7 % (ref 11.5–17)
FRACTIONAL SHORTENING: 40.4 % (ref 28–44)
GFR SERPLBLD CREATININE-BSD FMLA CKD-EPI: >60 ML/MIN/1.73/M2
GLOBULIN SER-MCNC: 2.9 GM/DL (ref 2.4–3.5)
GLUCOSE SERPL-MCNC: 105 MG/DL (ref 82–115)
HADV DNA NPH QL NAA+NON-PROBE: NOT DETECTED
HCOV 229E RNA NPH QL NAA+NON-PROBE: NOT DETECTED
HCOV HKU1 RNA NPH QL NAA+NON-PROBE: NOT DETECTED
HCOV NL63 RNA NPH QL NAA+NON-PROBE: NOT DETECTED
HCOV OC43 RNA NPH QL NAA+NON-PROBE: NOT DETECTED
HCT VFR BLD AUTO: 37.2 % (ref 42–52)
HGB BLD-MCNC: 12.2 G/DL (ref 14–18)
HMPV RNA NPH QL NAA+NON-PROBE: NOT DETECTED
HPIV1 RNA NPH QL NAA+NON-PROBE: NOT DETECTED
HPIV2 RNA NPH QL NAA+NON-PROBE: NOT DETECTED
HPIV3 RNA NPH QL NAA+NON-PROBE: NOT DETECTED
HPIV4 RNA NPH QL NAA+NON-PROBE: NOT DETECTED
HR MV ECHO: 39 BPM
IMM GRANULOCYTES # BLD AUTO: 0.02 X10(3)/MCL (ref 0–0.04)
IMM GRANULOCYTES NFR BLD AUTO: 0.3 %
INTERVENTRICULAR SEPTUM: 1.2 CM (ref 0.6–1.1)
LEFT ATRIUM AREA SYSTOLIC (APICAL 2 CHAMBER): 17.2 CM2
LEFT ATRIUM AREA SYSTOLIC (APICAL 4 CHAMBER): 16.9 CM2
LEFT ATRIUM SIZE: 4.1 CM
LEFT ATRIUM VOLUME INDEX MOD: 24 ML/M2
LEFT ATRIUM VOLUME MOD: 46 ML
LEFT CCA DIST DIAS: 21 CM/S
LEFT CCA DIST SYS: 103 CM/S
LEFT CCA PROX DIAS: 85 CM/S
LEFT CCA PROX SYS: 117 CM/S
LEFT ECA DIAS: 7 CM/S
LEFT ECA SYS: 78 CM/S
LEFT ICA DIST DIAS: 24 CM/S
LEFT ICA DIST SYS: 115 CM/S
LEFT ICA MID DIAS: 35 CM/S
LEFT ICA MID SYS: 161 CM/S
LEFT ICA PROX DIAS: 27 CM/S
LEFT ICA PROX SYS: 140 CM/S
LEFT INTERNAL DIMENSION IN SYSTOLE: 3.1 CM (ref 2.1–4)
LEFT VENTRICLE DIASTOLIC VOLUME INDEX: 69.47 ML/M2
LEFT VENTRICLE DIASTOLIC VOLUME: 132 ML
LEFT VENTRICLE END DIASTOLIC VOLUME APICAL 2 CHAMBER: 90.4 ML
LEFT VENTRICLE END DIASTOLIC VOLUME APICAL 4 CHAMBER: 128 ML
LEFT VENTRICLE END SYSTOLIC VOLUME APICAL 2 CHAMBER: 46.1 ML
LEFT VENTRICLE END SYSTOLIC VOLUME APICAL 4 CHAMBER: 45.1 ML
LEFT VENTRICLE MASS INDEX: 131 G/M2
LEFT VENTRICLE SYSTOLIC VOLUME INDEX: 20 ML/M2
LEFT VENTRICLE SYSTOLIC VOLUME: 38 ML
LEFT VENTRICULAR INTERNAL DIMENSION IN DIASTOLE: 5.2 CM (ref 3.5–6)
LEFT VENTRICULAR MASS: 248.8 G
LEFT VERTEBRAL DIAS: 10 CM/S
LEFT VERTEBRAL SYS: 52 CM/S
LV LATERAL E/E' RATIO: 11.4 M/S
LV SEPTAL E/E' RATIO: 14.7 M/S
LVED V (TEICH): 132 ML
LVES V (TEICH): 38.2 ML
LVOT MG: 2 MMHG
LVOT MV: 0.69 CM/S
LYMPHOCYTES # BLD AUTO: 2.46 X10(3)/MCL (ref 0.6–4.6)
LYMPHOCYTES NFR BLD AUTO: 34.9 %
M PNEUMO DNA NPH QL NAA+NON-PROBE: NOT DETECTED
MAGNESIUM SERPL-MCNC: 1.8 MG/DL (ref 1.6–2.6)
MCH RBC QN AUTO: 31.4 PG (ref 27–31)
MCHC RBC AUTO-ENTMCNC: 32.8 G/DL (ref 33–36)
MCV RBC AUTO: 95.6 FL (ref 80–94)
MONOCYTES # BLD AUTO: 0.74 X10(3)/MCL (ref 0.1–1.3)
MONOCYTES NFR BLD AUTO: 10.5 %
MV MEAN GRADIENT: 1 MMHG
MV PEAK A VEL: 0.91 M/S
MV PEAK E VEL: 1.03 M/S
MV PEAK GRADIENT: 5 MMHG
MV STENOSIS PRESSURE HALF TIME: 77 MS
MV VALVE AREA BY CONTINUITY EQUATION: 1.72 CM2
MV VALVE AREA P 1/2 METHOD: 2.86 CM2
NEUTROPHILS # BLD AUTO: 3.56 X10(3)/MCL (ref 2.1–9.2)
NEUTROPHILS NFR BLD AUTO: 50.5 %
NRBC BLD AUTO-RTO: 0 %
OHS CV CAROTID RIGHT ICA EDV HIGHEST: 30
OHS CV CAROTID ULTRASOUND LEFT ICA/CCA RATIO: 1.56
OHS CV CAROTID ULTRASOUND RIGHT ICA/CCA RATIO: 1.15
OHS CV PV CAROTID LEFT HIGHEST CCA: 117
OHS CV PV CAROTID LEFT HIGHEST ICA: 161
OHS CV PV CAROTID RIGHT HIGHEST CCA: 103
OHS CV PV CAROTID RIGHT HIGHEST ICA: 118
OHS CV US CAROTID LEFT HIGHEST EDV: 35
OHS LV EJECTION FRACTION SIMPSONS BIPLANE MOD: 61 %
OHS QRS DURATION: 96 MS
OHS QTC CALCULATION: 416 MS
PISA TR MAX VEL: 2.4 M/S
PLATELET # BLD AUTO: 193 X10(3)/MCL (ref 130–400)
PMV BLD AUTO: 9.5 FL (ref 7.4–10.4)
POTASSIUM SERPL-SCNC: 4.5 MMOL/L (ref 3.5–5.1)
PROT SERPL-MCNC: 6.7 GM/DL (ref 5.8–7.6)
RA PRESSURE ESTIMATED: 3 MMHG
RBC # BLD AUTO: 3.89 X10(6)/MCL (ref 4.7–6.1)
RIGHT CCA DIST DIAS: 22 CM/S
RIGHT CCA DIST SYS: 103 CM/S
RIGHT CCA PROX DIAS: 9 CM/S
RIGHT CCA PROX SYS: 76 CM/S
RIGHT ECA DIAS: 4 CM/S
RIGHT ECA SYS: 88 CM/S
RIGHT ICA DIST DIAS: 30 CM/S
RIGHT ICA DIST SYS: 116 CM/S
RIGHT ICA MID DIAS: 29 CM/S
RIGHT ICA MID SYS: 118 CM/S
RIGHT ICA PROX DIAS: 25 CM/S
RIGHT ICA PROX SYS: 98 CM/S
RIGHT VERTEBRAL DIAS: 9 CM/S
RIGHT VERTEBRAL SYS: 46 CM/S
RSV RNA NPH QL NAA+NON-PROBE: NOT DETECTED
RV TB RVSP: 5 MMHG
RV+EV RNA NPH QL NAA+NON-PROBE: NOT DETECTED
SINUS: 3.9 CM
SODIUM SERPL-SCNC: 134 MMOL/L (ref 136–145)
TDI LATERAL: 0.09 M/S
TDI SEPTAL: 0.07 M/S
TDI: 0.08 M/S
TR MAX PG: 23 MMHG
TRICUSPID ANNULAR PLANE SYSTOLIC EXCURSION: 2.3 CM
TV REST PULMONARY ARTERY PRESSURE: 26 MMHG
WBC # BLD AUTO: 7.05 X10(3)/MCL (ref 4.5–11.5)
Z-SCORE OF LEFT VENTRICULAR DIMENSION IN END DIASTOLE: -0.25
Z-SCORE OF LEFT VENTRICULAR DIMENSION IN END SYSTOLE: -0.46

## 2025-05-02 PROCEDURE — 63600175 PHARM REV CODE 636 W HCPCS: Performed by: NURSE PRACTITIONER

## 2025-05-02 PROCEDURE — 97161 PT EVAL LOW COMPLEX 20 MIN: CPT

## 2025-05-02 PROCEDURE — 97165 OT EVAL LOW COMPLEX 30 MIN: CPT

## 2025-05-02 PROCEDURE — 25000003 PHARM REV CODE 250: Performed by: SPECIALIST

## 2025-05-02 PROCEDURE — 25000003 PHARM REV CODE 250

## 2025-05-02 PROCEDURE — S4991 NICOTINE PATCH NONLEGEND: HCPCS | Performed by: NURSE PRACTITIONER

## 2025-05-02 PROCEDURE — 80053 COMPREHEN METABOLIC PANEL: CPT | Performed by: NURSE PRACTITIONER

## 2025-05-02 PROCEDURE — 25000003 PHARM REV CODE 250: Performed by: NURSE PRACTITIONER

## 2025-05-02 PROCEDURE — 87632 RESP VIRUS 6-11 TARGETS: CPT

## 2025-05-02 PROCEDURE — 27000221 HC OXYGEN, UP TO 24 HOURS

## 2025-05-02 PROCEDURE — 11000001 HC ACUTE MED/SURG PRIVATE ROOM

## 2025-05-02 PROCEDURE — 85025 COMPLETE CBC W/AUTO DIFF WBC: CPT | Performed by: NURSE PRACTITIONER

## 2025-05-02 PROCEDURE — 83735 ASSAY OF MAGNESIUM: CPT | Performed by: NURSE PRACTITIONER

## 2025-05-02 RX ORDER — BUTALBITAL, ACETAMINOPHEN AND CAFFEINE 50; 325; 40 MG/1; MG/1; MG/1
1 TABLET ORAL EVERY 6 HOURS PRN
Status: DISCONTINUED | OUTPATIENT
Start: 2025-05-02 | End: 2025-05-03 | Stop reason: HOSPADM

## 2025-05-02 RX ORDER — SODIUM CHLORIDE, SODIUM LACTATE, POTASSIUM CHLORIDE, CALCIUM CHLORIDE 600; 310; 30; 20 MG/100ML; MG/100ML; MG/100ML; MG/100ML
INJECTION, SOLUTION INTRAVENOUS CONTINUOUS
Status: ACTIVE | OUTPATIENT
Start: 2025-05-02 | End: 2025-05-03

## 2025-05-02 RX ORDER — FAMOTIDINE 20 MG/1
20 TABLET, FILM COATED ORAL DAILY
Status: DISCONTINUED | OUTPATIENT
Start: 2025-05-02 | End: 2025-05-03 | Stop reason: HOSPADM

## 2025-05-02 RX ORDER — ATORVASTATIN CALCIUM 10 MG/1
20 TABLET, FILM COATED ORAL NIGHTLY
Status: DISCONTINUED | OUTPATIENT
Start: 2025-05-02 | End: 2025-05-03 | Stop reason: HOSPADM

## 2025-05-02 RX ORDER — ACETAMINOPHEN 325 MG/1
650 TABLET ORAL EVERY 6 HOURS PRN
Status: DISCONTINUED | OUTPATIENT
Start: 2025-05-02 | End: 2025-05-03 | Stop reason: HOSPADM

## 2025-05-02 RX ORDER — ENOXAPARIN SODIUM 100 MG/ML
40 INJECTION SUBCUTANEOUS EVERY 24 HOURS
Status: DISCONTINUED | OUTPATIENT
Start: 2025-05-02 | End: 2025-05-03 | Stop reason: HOSPADM

## 2025-05-02 RX ADMIN — ONDANSETRON 4 MG: 2 INJECTION INTRAMUSCULAR; INTRAVENOUS at 09:05

## 2025-05-02 RX ADMIN — FAMOTIDINE 20 MG: 20 TABLET, FILM COATED ORAL at 06:05

## 2025-05-02 RX ADMIN — ACETAMINOPHEN 650 MG: 325 TABLET ORAL at 02:05

## 2025-05-02 RX ADMIN — NICOTINE 1 PATCH: 14 PATCH TRANSDERMAL at 09:05

## 2025-05-02 NOTE — CONSULTS
Neurosurgery  History & Physical    SUBJECTIVE:     Chief Complaint: nasal mass    History of Present Illness:  79 y.o. male  consult for nasal mass extending to skull base with erosion of bony elements. Presented to the ED 5/1/25 with a primary complaint of dizziness and N/V that was new. 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 CT of head demonstrated expansile lesion seen in his sphenoid sinus and sella and suprasellar region.  MRI of the brain with and without contrast impression reviewed demonstrated no abnormal parenchymal enhancement but large fluid-filled cavity center in his sellar region.      Review of patient's allergies indicates:  No Known Allergies    Current Medications[1]    Past Medical History:   Diagnosis Date    Fatigue     Hiatal hernia 11/08/2023    HLD (hyperlipidemia)     Hyperkalemia 11/07/2022    Deescalate potassium intake with diet reassess levels at follow-up in consider the Lasix or Kayexalate if persistent elevation is noted    Migraine 11/08/2023    Myalgia due to statin 11/08/2023    PVD (peripheral vascular disease) 11/08/2023    Tobacco use 11/08/2023    Valvular heart disease 11/08/2023     Past Surgical History:   Procedure Laterality Date    APPENDECTOMY      cataract surgery      DESTRUCTION, CILIARY BODY, USING LASER Left 3/12/2024    Procedure: IRIDEX - OS 4/15;  Surgeon: Lona Gutierrez MD;  Location: [facility] OR;  Service: Ophthalmology;  Laterality: Left;    IMPLANTATION OF DEVICE FOR GLAUCOMA Left 8/6/2024    Procedure: XEN- OS;  Surgeon: Lona Gutierrez MD;  Location: [facility] OR;  Service: Ophthalmology;  Laterality: Left;     Family History    None       Social History     Socioeconomic History    Marital status:    Tobacco Use    Smoking status: Every Day     Current packs/day: 1.00     Types: Cigarettes    Smokeless tobacco: Never    Tobacco comments:     1ppd since 17y   Substance and Sexual Activity    Alcohol use: Not Currently      Comment: once at night    Drug use: Never    Sexual activity: Yes     Social Drivers of Health     Financial Resource Strain: Low Risk  (5/2/2025)    Overall Financial Resource Strain (CARDIA)     Difficulty of Paying Living Expenses: Not hard at all   Food Insecurity: No Food Insecurity (5/2/2025)    Hunger Vital Sign     Worried About Running Out of Food in the Last Year: Never true     Ran Out of Food in the Last Year: Never true   Transportation Needs: No Transportation Needs (5/2/2025)    PRAPARE - Transportation     Lack of Transportation (Medical): No     Lack of Transportation (Non-Medical): No   Physical Activity: Inactive (11/22/2024)    Exercise Vital Sign     Days of Exercise per Week: 0 days     Minutes of Exercise per Session: 0 min   Stress: No Stress Concern Present (5/2/2025)    [employer] - Occupational Stress Questionnaire     Feeling of Stress : Not at all   Housing Stability: Low Risk  (5/2/2025)    Housing Stability Vital Sign     Unable to Pay for Housing in the Last Year: No     Number of Times Moved in the Last Year: 0     Homeless in the Last Year: No       Review of Systems  Constitutional:  Negative for activity change, appetite change, chills, diaphoresis, fatigue, fever and unexpected weight change.   HENT:  Negative for congestion, ear discharge, ear pain, hearing loss, mouth sores, nosebleeds, postnasal drip, rhinorrhea, sore throat and tinnitus.    Eyes:  Negative for photophobia, pain, discharge, itching and visual disturbance.   Respiratory:  Negative for apnea, chest tightness and shortness of breath.    Cardiovascular:  Negative for chest pain, palpitations and leg swelling.   Gastrointestinal:  Negative for abdominal distention, abdominal pain, blood in stool, constipation, diarrhea, nausea and vomiting.   Endocrine: Negative for cold intolerance and heat intolerance.   Genitourinary:  Negative for difficulty urinating, flank pain, hematuria and  "urgency.   Musculoskeletal:  Negative for arthralgias, back pain, gait problem, myalgias, neck pain and neck stiffness.   Skin:  Negative for color change and rash.   Allergic/Immunologic: Negative for environmental allergies, food allergies and immunocompromised state.   Neurological:  Negative for tremors,dizziness, syncope, light headedness, seizures, speech difficulty, weakness, numbness and headaches.   Hematological:  Negative for adenopathy. Does not bruise/bleed easily.   Psychiatric/Behavioral:  Negative for agitation, confusion, hallucinations and sleep disturbance.    All other systems reviewed and are negative.    OBJECTIVE:     Recent Labs   Lab 05/01/25  1728   WBC 13.87*   RBC 4.27*   HGB 13.2*   HCT 40.6*   MCV 95.1*   MCH 30.9   MCHC 32.5*   RDW 12.5      MPV 9.1             Recent Labs   Lab 05/01/25  1728   *   K 4.6   CL 96*   CO2 26   BUN 17.5   CREATININE 0.95   *   CALCIUM 9.2   MG 1.80   ALBUMIN 4.2   PROT 7.4   ALKPHOS 101   ALT 15   AST 19   BILITOT 0.2         Microbiology Results (last 7 days)         ** No results found for the last 168 hours. **             Vital Signs  Temp: 97.9 °F (36.6 °C)  Pulse: 61  Resp: 20  BP: 135/69  SpO2: 95 %  Height: 5' 8" (172.7 cm)  Weight: 74.9 kg (165 lb 2 oz)  Body mass index is 25.11 kg/m².      Neurosurgery Physical Exam  Nursing note and vitals reviewed.     Constitutional: He appears well-developed and well-nourished.      Eyes: Pupils are equal, round, and reactive to light. Conjunctivae and EOM are normal. Right eye exhibits no discharge. Left eye exhibits no discharge.   No scleral icterus.      Cardiovascular: Normal rate and intact distal pulses.      Abdominal: Soft. Bowel sounds are normal.      Skin: Skin displays no rash on trunk and no rash on extremities.      Psych/Behavior: He is alert. He is oriented to person, place, and time. He has a normal mood and affect.      Musculoskeletal: Gait is normal.        Neck: " Range of motion is full. Muscle strength is 5/5. Tone is normal.        Back: Range of motion is full. Muscle strength is 5/5. Tone is normal.        Right Upper Extremities: Range of motion is full. Muscle strength is 5/5. Tone is normal.        Left Upper Extremities: Range of motion is full. Muscle strength is 5/5. Tone is normal.       Right Lower Extremities: Range of motion is full. Muscle strength is 5/5. Tone is normal.        Left Lower Extremities: Range of motion is full. Muscle strength is 5/5. Tone is normal.      Neurological:        Coordination: He has a normal Romberg Test, normal finger to nose coordination, normal heel to shin coordination and normal tandem walking coordination.        Sensory: There is no sensory deficit in the trunk. There is no sensory deficit in the extremities.        DTRs: DTRs are DTRS NORMAL AND SYMMETRICnormal and symmetric. Tricep reflexes are 2+ on the right side and 2+ on the left side. Bicep reflexes are 2+ on the right side and 2+ on the left side. Brachioradialis reflexes are 2+ on the right side and 2+ on the left side. Patellar reflexes are 2+ on the right side and 2+ on the left side. Achilles reflexes are 2+ on the right side and 2+ on the left side.        Cranial nerves: Cranial nerve(s) II, IV, V, VI, VII, VIII, IX, X, XI and XII are intact. Chronic blindness L eye     Constitutional: appears well-developed and well-nourished. No distress. resting comfortably  HEENT: moist mucosal membranes, nose intact, ears intact, normocephalic, atraumatic, anicteric. Left face swollen (trauma) nontender.   Respiratory: intubated; no respiratory distress; mechanical ventilation, respirations normal, aerating well  Cardiovascular: regular rate and rhythm (easy if in ICU - monitor), extremities appear pink and well-perfused  Gastrointestinal (abdomen): soft, non-distended  Lymphatic: no edema  Musculoskeletal: strength exam, 5/5 throughout, moves all extremities  Skin: Skin  displays no rash on extremities. Skin displays no lesions on extremities. Incision intact; incision healed  Neurologic: Cranial nerves grossly intact, face symmetric, tongue midline, normal coordination, reflexes normal  Psychiatric: Alert. Oriented to person, place, and time. linear, normal range of affect       Diagnostic Results:  Imaging Results              CTA Chest Non-Coronary (PE Studies) (Final result)  Result time 05/02/25 09:23:20      Final result by Ziggy Alexandre MD (05/02/25 09:23:20)                   Impression:      1. Negative for pulmonary thromboembolic disease.  2. Elongated opacity along a left lower lobe bronchus suspicious for an obstructing endobronchial lesion.  Recommend correlation with bronchoscopy.      Electronically signed by: Ziggy Alexandre  Date:    05/02/2025  Time:    09:23               Narrative:    EXAMINATION:  CTA CHEST NON CORONARY (PE STUDIES)    CLINICAL HISTORY:  Pulmonary embolism (PE) suspected, high prob;short of breath.    TECHNIQUE:  Helical acquisition through the chest with IV contrast targeting the pulmonary arteries. Multiplanar and 3D MIP reconstructed images were provided for review.   mGycm. Automatic exposure control, adjustment of mA/kV or iterative reconstruction technique was used to reduce radiation.    COMPARISON:  No prior chest CT.    FINDINGS:  There is good opacification of the pulmonary arterial tree. There is no evidence of pulmonary thromboembolism.  There is no aortic dissection.    There is no mediastinal, hilar or axillary lymphadenopathy by size criteria.  There is gynecomastia.    Heart is not enlarged.  No pericardial effusion.  There are coronary artery calcifications.    There is no pleural effusion.  No consolidation suspicious for pneumonia.  There is elongated opacity along a left lower lobe segmental bronchus best visualized coronal image 75 series 9.  Findings are suspicious for an obstructing endobronchial lesion.  There is  a 5 mm left lower lobe nodule image 76 series 8.    There are no acute findings in the imaged upper abdomen.  No acute osseous findings.                        Preliminary result by Danny Ramires MD (05/01/25 23:38:11)                   Impression:    1. No filling defects are seen in the pulmonary arteries to suggest pulmonary embolus.  2. No acute focal infiltrate or consolidation is identified in the lungs. There is a 1.2 cm diameter somewhat tubular solitary pulmonary nodule at the left lung base on images 62 through 73 of axial series 8 with suggestion of some micro spiculation on image 70 series 8. This is somewhat worrisome for a neoplastic process. Correlate with clinical and laboratory findings as regards additional evaluation and recommend follow-up as indicated.  3. Details and other findings as discussed above.               Narrative:    START OF REPORT:  Technique: CT Scan of the chest was performed with intravenous contrast with direct axial images as well as sagittal and coronal reconstruction images pulmonary embolus protocol.    Dosage Information: Automated Exposure Control was utilized 204.37 mGy.cm.    Comparison: None.    Clinical History: Suspected PE, high prob, denies SOB or CP.    Findings:  Soft Tissues: Unremarkable.  Lines and Tubes: None.  Neck: The visualized soft tissues of the neck appear unremarkable.  Mediastinum: The mediastinal structures are within normal limits.  Heart: The heart size is within normal limits. Moderate coronary artery calcification is seen.  Aorta: Mild aortic calcification is seen in the thoracic aorta.  Pulmonary Arteries: No filling defects are seen in the pulmonary arteries to suggest pulmonary embolus.  Lungs: No acute focal infiltrate or consolidation is identified in the lungs. There is a 1.2 cm diameter somewhat tubular solitary pulmonary nodule at the left lung base on images 62 through 73 of axial series 8 with suggestion of some micro spiculation  on image 70 series 8.  Pleura: No effusions or pneumothorax are identified.  Bony Structures:  Spine: Mild spondylolytic changes are seen in the thoracic spine.  Abdomen: The visualized upper abdominal organs appear unremarkable.                                         MRI Brain W WO Contrast (Final result)  Result time 05/02/25 08:23:49      Final result by Eliu Yancey MD (05/02/25 08:23:49)                   Impression:      Large expansile cystic mass with some mural thickening/enhancement most notable posteriorly centered within the sphenoid sinus and clivus but also involving the petrous apices bilaterally.  See full discussion above.  Different diagnosis includes chordoma, large mucocele arising from the sphenoid sinus, large ecchordosis physaliphora with or without associated meningocele.      Electronically signed by: Eliu Yancey  Date:    05/02/2025  Time:    08:23               Narrative:    EXAMINATION:  MRI BRAIN W WO CONTRAST    CLINICAL HISTORY:  Dizziness, non-specific;expansile lesion noted on CT;    TECHNIQUE:  Multiplanar multisequence MR imaging of the brain was performed before and after the administration of 15 mL MultiHance intravenous contrast.    COMPARISON:  CT head without contrast 05/01/2025.    FINDINGS:  There is a 4.7 cm x 4.5 cm x 4 cm (AP, cc, transverse dimensions) expansile cystic lesion with some mural thickening/enhancement most notable posteriorly centered within the sphenoid sinus and clivus but also extending posterolaterally to involve the petrous apices.  It partially encases both intracranial internal carotid arteries.  On concurrent CT head without contrast there is a small amount of curvilinear calcification along the periphery of the lesion which may represent matrix calcification and/or destroyed bone fragments.  The floor of the sella is also dehiscent on concurrent head CT.    No evidence of acute ischemic infarct, parenchymal diffusion restriction, intracranial  hemorrhage, hydrocephalus, or herniation.  Mild chronic small vessel ischemic changes along the supratentorial periventricular white matter.    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  No evidence of arachnoid cyst.    Bilateral lens replacements.                        Preliminary result by Danny Ramires MD (05/01/25 20:38:16)                   Impression:    1. No abnormal parenchymal enhancement is seen.  2. There is a large fluid filled cavity centered in the sphenoid/sellar region with minimally irregular and enhancing margins measuring 4.6 x 4.2 cm seen centered on series 9 image 66. This may represent a cystic tumor versus post surgical cavity which appears similar to the prior CT scan findings. Correlate with clinical and laboratory findings as well as prior procedural or surgical history and recommend additional evaluation and follow-up as indicated.  3. Details and other findings as discussed above.               Narrative:    START OF REPORT:  Technique: Multiplanar, multisequence magnetic resonance imaging of the brain was performed without and then with intravenous contrast.    Comparison: Correlation is with CT study dated 2025-05-01 17:44:27.    Clinical history: Dizziness, n/v, abnormal ct.    Findings:  Hemorrhage: No acute intracranial hemorrhage is identified.  Stroke: No abnormal signal is identified on the diffusion images to suggest acute infarct.  Extra axial spaces: There is prominence of the retrocerebellar spaces seen. This may represent a randi cisternal magna versus arachnoid cyst.  Cerebellopontine angles: Normal.  Cerebral, cerebellar, and brainstem parenchyma: Within normal limits. No abnormal parenchymal enhancement is seen.  Vascular system: Normal flow voids.  Sella and skull base: There is a large fluid filled cavity centered in the sphenoid/sellar region with minimally irregular and enhancing margins measuring 4.6 x 4.2 cm seen centered on series 9 image 66. This may  represent a cystic tumor versus post surgical cavity which appears similar to the prior CT scan findings.                                         CT Head Without Contrast (Final result)  Result time 05/01/25 18:02:08      Final result by Eddie Mason MD (05/01/25 18:02:08)                   Impression:      Expansile lesion seen in the sphenoid sinus and sella and suprasellar region as also involving the clivus.  Contrast enhanced MRI correlation is recommended.  The patient has no surgical history in this region.    Cystic lesion in the posterior fossa likely representing an arachnoid cyst.  It appears benign.      Electronically signed by: Nestor Mason  Date:    05/01/2025  Time:    18:02               Narrative:    EXAMINATION:  CT HEAD WITHOUT CONTRAST    CLINICAL HISTORY:  Dizziness, persistent/recurrent, cardiac or vascular cause suspected;    TECHNIQUE:  Multiple axial images were obtained from the base of the brain to the vertex without contrast administration.  Sagittal and coronal reconstructions were performed. .Automatic exposure control  (AEC) is utilized to reduce patient radiation exposure.    COMPARISON:  None    FINDINGS:  There is no intracranial mass or lesion seen.  No hemorrhage is seen.  No infarct is seen.  The ventricles and basilar cisterns appear normal.  Brain parenchyma appears grossly unremarkable.    The cerebellum appears unremarkable.  There is a cystic lesion seen in the posterior fossa which likely represents an arachnoid cyst..  The calvarium is intact.  There is a area of mucosal thickening in the ethmoid sinus.  There is mucosal thickening in the maxillary sinuses bilaterally.  Mild mucosal thickening seen in the frontal sinus    There is an expansile lesion seen in the sphenoid sinus extending into the sella and suprasellar region.  There is also involvement of the clivus.  The lesion has fat density.  Is expansile and causes some remodeling of the bone at the base  of the skull.                                       X-Ray Chest 1 View (Final result)  Result time 05/01/25 17:28:09      Final result by Eddie Mason MD (05/01/25 17:28:09)                   Impression:      No abnormality seen      Electronically signed by: Nestor Mason  Date:    05/01/2025  Time:    17:28               Narrative:    EXAMINATION:  XR CHEST 1 VIEW    CLINICAL HISTORY:  Shortness of breath    TECHNIQUE:  Single frontal view of the chest was performed.    COMPARISON:  None available    FINDINGS:  The lungs are clear.  The heart is normal appearance.  The pulmonary vascularity is unremarkable.  Aorta appears grossly unremarkable.  No pleural effusions are seen.  Bones and joints show no acute abnormality.                                        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 No emergent surgical needs. Discussed potential treatment including combined transnasal case w/ ENT for resection vs simple biopsy for diagnosis. All attendant risks/benefits/alternatives/indications discussed w/ patient. He states he sees an ENT locally (Dr. Nix?) in [place] and would like to follow up there; he has an appointment in 2 weeks. We can have our ENT colleagues evaluate the patient here and discuss imaging as well. Will leave up to patient ultimately. He has no signs/symptoms of CSF leak or cranial nerve deficit aside from chronic L eye blindness. If he wishes to discuss further can be seen as outpatient in neurosurgery office in 2 weeks. Patient and wife voiced understanding and are in agreement.     Please call with questions/concerns.        [1]   Current Facility-Administered Medications   Medication Dose Route Frequency Provider Last Rate Last Admin    acetaminophen tablet 650 mg  650 mg Oral Q6H PRN Ha Ko MD        aluminum-magnesium  hydroxide-simethicone 200-200-20 mg/5 mL suspension 30 mL  30 mL Oral QID PRN Lizabeth Gilbert FNP        atorvastatin tablet 20 mg  20 mg Oral QHS Ha Ko MD        bisacodyL suppository 10 mg  10 mg Rectal Daily PRN Lizabeth Gilbert, VLADIMIR        dextrose 50% injection 12.5 g  12.5 g Intravenous PRN Lizabeth Gilbert, VLADIMIR        dextrose 50% injection 25 g  25 g Intravenous PRN Lziabeth Gilbert, VLADIMIR        glucagon (human recombinant) injection 1 mg  1 mg Intramuscular PRN Lizabeth Gilbert, VLADIMIR        glucose chewable tablet 16 g  16 g Oral PRLizabeth Devi, VLADIMIR        glucose chewable tablet 24 g  24 g Oral PRLizabeth Devi, VLADIMIR        melatonin tablet 6 mg  6 mg Oral Nightly PRLizabeth Devi FNP        naloxone 0.4 mg/mL injection 0.02 mg  0.02 mg Intravenous PRN Lizabeth Gilbert, VLADIMIR        nicotine 14 mg/24 hr 1 patch  1 patch Transdermal Daily Elizabeth Zuleta FNP   1 patch at 05/02/25 0917    ondansetron injection 4 mg  4 mg Intravenous Q4H PRLizabeth Devi FNP   4 mg at 05/02/25 0926    prochlorperazine injection Soln 5 mg  5 mg Intravenous Q6H PRN Lizabeth Gilbert, VLADIMIR        senna-docusate 8.6-50 mg per tablet 1 tablet  1 tablet Oral BID PRN Lizabeth Gilbert, VLADIMIR        sodium chloride 0.9% flush 10 mL  10 mL Intravenous PRN Lizabeth Gilbert, HUGHP

## 2025-05-02 NOTE — PT/OT/SLP EVAL
Physical Therapy Evaluation and Discharge Note    Patient Name:  Fred Campos   MRN:  [id number]    Recommendations:     Discharge therapy intensity: No Therapy Indicated   Discharge Equipment Recommendations: none   Barriers to discharge: none    Assessment:     Fred Campos is a 79 y.o. male admitted with a medical diagnosis of acute respiratory failure. .  At this time, patient is functioning at their prior level of function and does not require further acute PT services.     Recent Surgery: * No surgery found *      Plan:     During this hospitalization, patient does not require further acute PT services.  Please re-consult if situation changes.      Subjective     Chief Complaint:   Patient/Family Comments/goals:   Pain/Comfort:       Patients cultural, spiritual, Orthodox conflicts given the current situation:      Living Environment:  Pt lives with wife in house with no steps  Prior to admission, patients level of function was ind .  Equipment used at home: none.  DME owned (not currently used): .  Upon discharge, patient will have assistance from wife .    Objective:     Communicated with nurse prior to session.  Patient found supine with   upon PT entry to room.    General Precautions: Standard,      Orthopedic Precautions:N/A   Braces: N/A  Respiratory Status: Room air  Blood Pressure:     Exams:  RLE ROM: WFL  RLE Strength: WFL  LLE ROM: WFL  LLE Strength: WFL    Functional Mobility:  Bed Mobility:     Supine to Sit: independence  Sit to Supine: independence  Transfers:     Sit to Stand:  independence with no AD  Bed to Chair: independence with  no AD  using  Stand Pivot  Gait: ambulated in 100ft no lob with no ad    AM-PAC 6 CLICK MOBILITY  Total Score:        Treatment and Education:      Patient provided with verbal education education regarding PT role/goals/POC.  Understanding was verbalized.     Patient left supine with all lines intact.    GOALS:   Multidisciplinary Problems       Physical  Therapy Goals       Not on file                    History:     Past Medical History:   Diagnosis Date    Fatigue     Hiatal hernia 11/08/2023    HLD (hyperlipidemia)     Hyperkalemia 11/07/2022    Deescalate potassium intake with diet reassess levels at follow-up in consider the Lasix or Kayexalate if persistent elevation is noted    Migraine 11/08/2023    Myalgia due to statin 11/08/2023    PVD (peripheral vascular disease) 11/08/2023    Tobacco use 11/08/2023    Valvular heart disease 11/08/2023       Past Surgical History:   Procedure Laterality Date    APPENDECTOMY      cataract surgery      DESTRUCTION, CILIARY BODY, USING LASER Left 3/12/2024    Procedure: IRIDEX - OS 4/15;  Surgeon: Lona Gutierrez MD;  Location: [facility] OR;  Service: Ophthalmology;  Laterality: Left;    IMPLANTATION OF DEVICE FOR GLAUCOMA Left 8/6/2024    Procedure: XEN- OS;  Surgeon: Lona Gutierrez MD;  Location: [facility] OR;  Service: Ophthalmology;  Laterality: Left;       Time Tracking:     PT Received On:    PT Start Time: 1145     PT Stop Time: 1158  PT Total Time (min): 13 min     Billable Minutes: Evaluation 13 05/02/2025

## 2025-05-02 NOTE — PT/OT/SLP EVAL
Occupational Therapy   Evaluation and Discharge Note    Name: Fred Campos  MRN: [id number]  Admitting Diagnosis: acute resp failure with hypoxia, dizziness, bradycardia   Recent Surgery: * No surgery found *      Recommendations:     Discharge therapy intensity: No Therapy Indicated   Discharge Equipment Recommendations: none  Barriers to discharge:       Assessment:     Fred Campos is a 79 y.o. male with a medical diagnosis of acute resp failure with hypoxia, dizziness, bradycardia . On eval, patient presents with no deficits in ADLs/functional mobility. Skilled OT services are not warranted at this time.  Pt ambulated around room, no dizziness, no LOB, no weakness. OT signing off.     Plan:     OT to sign off as acute OT services are not warranted at this time.  Please re-consult if situation changes during this hospitalization.    Plan of Care Reviewed with: patient, spouse    Subjective     Chief Complaint: none   Patient/Family Comments/goals: go home     Occupational Profile:  Living Environment: pt lives wife wife, no steps, walk in shower   Previous level of function: indep   Roles and Routines: drives, retired   Equipment Used at Home: cane, straight, walker, rolling, shower chair, grab bar  Assistance upon Discharge: wife     Pain/Comfort:       Patients cultural, spiritual, Jew conflicts given the current situation:      Objective:     OT communicated with nrsg prior to session.      Patient was found HOB elevated with   upon OT entry to room.    General Precautions: Standard,    Orthopedic Precautions:    Braces:      Vital Signs: HR: 110 with mobility     Bed Mobility:    Patient completed Supine to Sit with independence  Patient completed Sit to Supine with independence    Functional Mobility/Transfers:  Patient completed Sit <> Stand Transfer with independence  with  no assistive device   Functional Mobility: no LOB    Activities of Daily Living:  Lower Body Dressing: independence     Toileting: independence      [facility] 6 Click ADL:  [facility] Total Score: 24    Functional Cognition:  Affect: Appropriate to situation and Cooperative    Visual Perceptual Skills:  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      Patient Education:  Patient and spouse were provided with verbal education education regarding OT role/goals/POC, fall prevention, and safety awareness.  Understanding was verbalized.     Patient left HOB elevated with all lines intact, call button in reach, wife  notified, and nrsg present.    History:     Past Medical History:   Diagnosis Date    Fatigue     Hiatal hernia 11/08/2023    HLD (hyperlipidemia)     Hyperkalemia 11/07/2022    Deescalate potassium intake with diet reassess levels at follow-up in consider the Lasix or Kayexalate if persistent elevation is noted    Migraine 11/08/2023    Myalgia due to statin 11/08/2023    PVD (peripheral vascular disease) 11/08/2023    Tobacco use 11/08/2023    Valvular heart disease 11/08/2023         Past Surgical History:   Procedure Laterality Date    APPENDECTOMY      cataract surgery      DESTRUCTION, CILIARY BODY, USING LASER Left 3/12/2024    Procedure: IRIDEX - OS 4/15;  Surgeon: Lona Gutierrez MD;  Location: [facility] OR;  Service: Ophthalmology;  Laterality: Left;    IMPLANTATION OF DEVICE FOR GLAUCOMA Left 8/6/2024    Procedure: XEN- OS;  Surgeon: Lona Gutierrez MD;  Location: [facility] OR;  Service: Ophthalmology;  Laterality: Left;       Time Tracking:     OT Date of Treatment:    OT Start Time: 1051  OT Stop Time: 1100  OT Total Time (min): 9 min    Billable Minutes:Evaluation Low Complexity     5/2/2025

## 2025-05-02 NOTE — H&P
[facility] Hospital Medicine History & Physical Examination       Patient Name: Fred Campos  MRN: [id number]  Patient Class: IP- Inpatient   Admission Date: 5/1/2025   Admitting Physician: FLAKITO Service   Length of Stay: 1  Attending Physician: Dr. Ha Ko  Primary Care Provider: Ronna Shirley MD  Face-to-Face encounter date: 05/02/2025  Code Status: Full code  Chief Complaint: Dizziness and Vomiting (Pt states the dizziness started last night. Vomiting started at 2 pm. Pt up able to lift head up without vomiting. )        Screening for Social Drivers for health:  Patient screened for food insecurity, housing instability, transportation needs, utility difficulties, and interpersonal safety (select all that apply as identified as concern)  []Housing or Food  []Transportation Needs  []Utility Difficulties  []Interpersonal safety  [x]None    Patient information was obtained from patient, patient's family, past medical records and/or ER records.     HISTORY OF PRESENT ILLNESS:   Fred Campos is a 79 y.o. male who past medical history includes HTN, HLD, PVD, valvular heart disease migraine headaches,  glaucoma,  anemia, tobacco abuse, presents to the ED at [facility] on 5/1/2025 with a primary complaint of dizziness and.  Patient's symptoms started about 2:00 p.m. the day prior to arrival in the ED.  History reported he was unable to this has set him secondary to is nausea vomiting.  With his chest fever, chills, cough, congestion or any sick contacts.  Lab work reviewed demonstrated WBCs 13.87, H&H 13.2/40.6, sodium 133, chloride 96, glucose 152; other indices unremarkable.  Chest x-ray impression reviewed demonstrated no abnormality seen.  CT of head without contrast impression reviewed demonstrated cystic lesion in the posterior fossa likely representing an arachnoid cyst, expansile lesion seen in his sphenoid sinus and sella and suprasellar region.  MRI of the brain with  and without contrast impression reviewed demonstrated no abnormal parenchymal enhancement is seen, large fluid-filled cavity center in his adenoid sellar region with minimal irregularity.  CTA PE studies impression reviewed demonstrated no filling defects are seen pulmonary arteries.    Initial vital signs /81 pulse 88 respirations 20 temperature 97.5° F O2 saturation 9% room.  Patient was placed oxygen therapy per nasal cannula in his saturations to 99.  ED provider did speak with Neurosurgery Services who will see patient in consultation.  Patient to services management.      PAST MEDICAL HISTORY:     Past Medical History:   Diagnosis Date    Fatigue     Hiatal hernia 11/08/2023    HLD (hyperlipidemia)     Hyperkalemia 11/07/2022    Deescalate potassium intake with diet reassess levels at follow-up in consider the Lasix or Kayexalate if persistent elevation is noted    Migraine 11/08/2023    Myalgia due to statin 11/08/2023    PVD (peripheral vascular disease) 11/08/2023    Tobacco use 11/08/2023    Valvular heart disease 11/08/2023       PAST SURGICAL HISTORY:     Past Surgical History:   Procedure Laterality Date    APPENDECTOMY      cataract surgery      DESTRUCTION, CILIARY BODY, USING LASER Left 3/12/2024    Procedure: IRIDEX - OS 4/15;  Surgeon: Lona Gutierrez MD;  Location: [facility] OR;  Service: Ophthalmology;  Laterality: Left;    IMPLANTATION OF DEVICE FOR GLAUCOMA Left 8/6/2024    Procedure: XEN- OS;  Surgeon: Lona Gutierrez MD;  Location: [facility] OR;  Service: Ophthalmology;  Laterality: Left;       ALLERGIES:   Patient has no known allergies.    FAMILY HISTORY:   Reviewed and negative    SOCIAL HISTORY:     Social History     Tobacco Use    Smoking status: Every Day     Current packs/day: 1.00     Types: Cigarettes    Smokeless tobacco: Never    Tobacco comments:     1ppd since 17y   Substance Use Topics    Alcohol use: Not Currently     Comment: once at night        HOME MEDICATIONS:   As  documented  Prior to Admission medications    Medication Sig Start Date End Date Taking? Authorizing Provider   famotidine (PEPCID) 20 MG tablet Take 20 mg by mouth Daily.   Yes Provider, Historical   butalbital-acetaminophen-caffeine -40 mg (FIORICET, ESGIC) -40 mg per tablet Take 1 tablet by mouth every 6 (six) hours as needed for Pain or Headaches. 24   Ronna Shirley MD   dorzolamide (TRUSOPT) 2 % ophthalmic solution Place 1 drop into the left eye 2 (two) times daily.  Patient not taking: Reported on 2024 10/2/23   Provider, Historical   dorzolamide-timolol 2-0.5% (COSOPT) 22.3-6.8 mg/mL ophthalmic solution SMARTSI Drop(s) Left Eye Morning-Night  Patient not taking: Reported on 2024   Provider, Historical   methazoLAMIDE (NEPTAZANE) 50 MG Tab Take 50 mg by mouth 3 (three) times daily.  Patient not taking: Reported on 2024   Provider, Historical   pravastatin (PRAVACHOL) 10 MG tablet Take 1 tablet (10 mg total) by mouth every other day.  Patient not taking: Reported on 2024   Alberto Poe MD   prednisoLONE acetate (PRED FORTE) 1 % DrpS Place 1 drop into the left eye 2 (two) times daily. 24   Provider, Historical   VYZULTA 0.024 % Drop Apply 1 drop to eye.  Patient not taking: Reported on 2024   Provider, Historical       REVIEW OF SYSTEMS:   Except as documented, all other systems reviewed and negative     PHYSICAL EXAM:     VITAL SIGNS: 24 HRS MIN & MAX LAST   Temp  Min: 97.5 °F (36.4 °C)  Max: 97.5 °F (36.4 °C) 97.5 °F (36.4 °C)   BP  Min: 131/49  Max: 185/81 138/66   Pulse  Min: 42  Max: 88  (!) 47   Resp  Min: 12  Max: 20 12   SpO2  Min: 85 %  Max: 100 % 99 %       General appearance: Well-developed, well-nourished male chronically ill-appearing fatigued, nontoxic in no apparent distress.  HENT: Atraumatic head. Moist mucous membranes of oral cavity.  Eyes: PERRL.   Lungs: Clear to auscultation bilaterally. No  wheezing present.   Heart: Regular rate and rhythm. S1 and S2 present systolic murmur; cap refill brisk, edema BLE  Abdomen: Soft, non-distended, non-tender. Bowel sounds are normal.   Extremities: No cyanosis, clubbing, generalized weakness  Skin: warm and dry  Neuro: oriented, fatigued, no acute focal deficits, mild headache reported  Psych/mental status: flat affect, cooperative    LABS AND IMAGING:     Recent Labs   Lab 05/01/25  1728   WBC 13.87*   RBC 4.27*   HGB 13.2*   HCT 40.6*   MCV 95.1*   MCH 30.9   MCHC 32.5*   RDW 12.5      MPV 9.1       Recent Labs   Lab 05/01/25  1728   *   K 4.6   CL 96*   CO2 26   BUN 17.5   CREATININE 0.95   *   CALCIUM 9.2   MG 1.80   ALBUMIN 4.2   PROT 7.4   ALKPHOS 101   ALT 15   AST 19   BILITOT 0.2       Microbiology Results (last 7 days)       ** No results found for the last 168 hours. **             CTA Chest Non-Coronary (PE Studies)  START OF REPORT:  Technique: CT Scan of the chest was performed with intravenous contrast with direct axial images as well as sagittal and coronal reconstruction images pulmonary embolus protocol.    Dosage Information: Automated Exposure Control was utilized 204.37 mGy.cm.    Comparison: None.    Clinical History: Suspected PE, high prob, denies SOB or CP.    Findings:  Soft Tissues: Unremarkable.  Lines and Tubes: None.  Neck: The visualized soft tissues of the neck appear unremarkable.  Mediastinum: The mediastinal structures are within normal limits.  Heart: The heart size is within normal limits. Moderate coronary artery calcification is seen.  Aorta: Mild aortic calcification is seen in the thoracic aorta.  Pulmonary Arteries: No filling defects are seen in the pulmonary arteries to suggest pulmonary embolus.  Lungs: No acute focal infiltrate or consolidation is identified in the lungs. There is a 1.2 cm diameter somewhat tubular solitary pulmonary nodule at the left lung base on images 62 through 73 of axial series  8 with suggestion of some micro spiculation on image 70 series 8.  Pleura: No effusions or pneumothorax are identified.  Bony Structures:  Spine: Mild spondylolytic changes are seen in the thoracic spine.  Abdomen: The visualized upper abdominal organs appear unremarkable.    Impression:  1. No filling defects are seen in the pulmonary arteries to suggest pulmonary embolus.  2. No acute focal infiltrate or consolidation is identified in the lungs. There is a 1.2 cm diameter somewhat tubular solitary pulmonary nodule at the left lung base on images 62 through 73 of axial series 8 with suggestion of some micro spiculation on image 70 series 8. This is somewhat worrisome for a neoplastic process. Correlate with clinical and laboratory findings as regards additional evaluation and recommend follow-up as indicated.  3. Details and other findings as discussed above.  MRI Brain W WO Contrast  START OF REPORT:  Technique: Multiplanar, multisequence magnetic resonance imaging of the brain was performed without and then with intravenous contrast.    Comparison: Correlation is with CT study dated 2025-05-01 17:44:27.    Clinical history: Dizziness, n/v, abnormal ct.    Findings:  Hemorrhage: No acute intracranial hemorrhage is identified.  Stroke: No abnormal signal is identified on the diffusion images to suggest acute infarct.  Extra axial spaces: There is prominence of the retrocerebellar spaces seen. This may represent a randi cisternal magna versus arachnoid cyst.  Cerebellopontine angles: Normal.  Cerebral, cerebellar, and brainstem parenchyma: Within normal limits. No abnormal parenchymal enhancement is seen.  Vascular system: Normal flow voids.  Sella and skull base: There is a large fluid filled cavity centered in the sphenoid/sellar region with minimally irregular and enhancing margins measuring 4.6 x 4.2 cm seen centered on series 9 image 66. This may represent a cystic tumor versus post surgical cavity which appears  similar to the prior CT scan findings.    Impression:  1. No abnormal parenchymal enhancement is seen.  2. There is a large fluid filled cavity centered in the sphenoid/sellar region with minimally irregular and enhancing margins measuring 4.6 x 4.2 cm seen centered on series 9 image 66. This may represent a cystic tumor versus post surgical cavity which appears similar to the prior CT scan findings. Correlate with clinical and laboratory findings as well as prior procedural or surgical history and recommend additional evaluation and follow-up as indicated.  3. Details and other findings as discussed above.  CT Head Without Contrast  Narrative: EXAMINATION:  CT HEAD WITHOUT CONTRAST    CLINICAL HISTORY:  Dizziness, persistent/recurrent, cardiac or vascular cause suspected;    TECHNIQUE:  Multiple axial images were obtained from the base of the brain to the vertex without contrast administration.  Sagittal and coronal reconstructions were performed. .Automatic exposure control  (AEC) is utilized to reduce patient radiation exposure.    COMPARISON:  None    FINDINGS:  There is no intracranial mass or lesion seen.  No hemorrhage is seen.  No infarct is seen.  The ventricles and basilar cisterns appear normal.  Brain parenchyma appears grossly unremarkable.    The cerebellum appears unremarkable.  There is a cystic lesion seen in the posterior fossa which likely represents an arachnoid cyst..  The calvarium is intact.  There is a area of mucosal thickening in the ethmoid sinus.  There is mucosal thickening in the maxillary sinuses bilaterally.  Mild mucosal thickening seen in the frontal sinus    There is an expansile lesion seen in the sphenoid sinus extending into the sella and suprasellar region.  There is also involvement of the clivus.  The lesion has fat density.  Is expansile and causes some remodeling of the bone at the base of the skull.  Impression: Expansile lesion seen in the sphenoid sinus and sella and  suprasellar region as also involving the clivus.  Contrast enhanced MRI correlation is recommended.  The patient has no surgical history in this region.    Cystic lesion in the posterior fossa likely representing an arachnoid cyst.  It appears benign.    Electronically signed by: Nestor Mason  Date:    05/01/2025  Time:    18:02  X-Ray Chest 1 View  Narrative: EXAMINATION:  XR CHEST 1 VIEW    CLINICAL HISTORY:  Shortness of breath    TECHNIQUE:  Single frontal view of the chest was performed.    COMPARISON:  None available    FINDINGS:  The lungs are clear.  The heart is normal appearance.  The pulmonary vascularity is unremarkable.  Aorta appears grossly unremarkable.  No pleural effusions are seen.  Bones and joints show no acute abnormality.  Impression: No abnormality seen    Electronically signed by: Nestor Mason  Date:    05/01/2025  Time:    17:28        ASSESSMENT & PLAN:   ASSESSMENT:  Acute respiratory failure with hypoxia-POA   HTN- urgency- POA  Dizziness-POA   Symptomatic bradycardia-POA   Brain mass-sellar, suprasellar mass-POA   Weakness- POA    Hx of HLD, PVD, valvular heart disease,  migraine headaches,  glaucoma,  anemia, tobacco abuse      PLAN:  Consult neurosurgery services appreciate assistance and recommendations  Cardiology Services consulted appreciate assistance and recommendations  Fall precautions  Cotninue with supplemental O2 therapy wean as tolerated  PT OT eval and treat  Echocardiogram  Carotid US  Home medication as deemed necessary  Lab work in the a.m.   continue with supplemental oxygen therapy keep O2 saturations 94 greater  Monitor pulse oximetry    VTE Prophylaxis: SCD for DVT prophylaxis and will be advised to be as mobile as possible and sit in a chair as tolerated    Patient condition:  Stable    __________________________________________________________________________  INPATIENT LIST OF MEDICATIONS     Scheduled Meds:   nicotine  1 patch Transdermal Daily     Continuous  Infusions:  PRN Meds:.  Current Facility-Administered Medications:     acetaminophen, 1,000 mg, Oral, Q6H PRN    aluminum-magnesium hydroxide-simethicone, 30 mL, Oral, QID PRN    bisacodyL, 10 mg, Rectal, Daily PRN    dextrose 50%, 12.5 g, Intravenous, PRN    dextrose 50%, 25 g, Intravenous, PRN    glucagon (human recombinant), 1 mg, Intramuscular, PRN    glucose, 16 g, Oral, PRN    glucose, 24 g, Oral, PRN    melatonin, 6 mg, Oral, Nightly PRN    naloxone, 0.02 mg, Intravenous, PRN    ondansetron, 4 mg, Intravenous, Q4H PRN    prochlorperazine, 5 mg, Intravenous, Q6H PRN    senna-docusate, 1 tablet, Oral, BID PRN    sodium chloride 0.9%, 10 mL, Intravenous, PRN      I, VLADIMIR Tanner have reviewed and discussed the case with   Dr. Ha Ko. Please see the following addendum for further assessment and plan from their attending MD.This note was created with a assistance of electronic voice recognition software.  There may be transcription errors as a result of using this technology however minimal; and effort has been made to assure accuracy of the transcription but any obvious areas or admissions should be clarified with the author of the document   VLADIMIR Snow   05/02/2025    ________________________________________________________________________________

## 2025-05-02 NOTE — PLAN OF CARE
Problem: Adult Inpatient Plan of Care  Goal: Plan of Care Review  Outcome: Progressing  Flowsheets (Taken 5/2/2025 1016)  Plan of Care Reviewed With:   patient   spouse  Goal: Patient-Specific Goal (Individualized)  Outcome: Progressing  Goal: Absence of Hospital-Acquired Illness or Injury  Outcome: Progressing  Intervention: Identify and Manage Fall Risk  Flowsheets (Taken 5/2/2025 1016)  Safety Promotion/Fall Prevention:   assistive device/personal item within reach   Fall Risk reviewed with patient/family   nonskid shoes/socks when out of bed   side rails raised x 2  Intervention: Prevent Skin Injury  Flowsheets (Taken 5/2/2025 1016)  Body Position:   weight shifting   position changed independently  Intervention: Prevent and Manage VTE (Venous Thromboembolism) Risk  Flowsheets (Taken 5/2/2025 1016)  VTE Prevention/Management: ambulation promoted  Intervention: Prevent Infection  Flowsheets (Taken 5/2/2025 1016)  Infection Prevention:   rest/sleep promoted   single patient room provided   hand hygiene promoted  Goal: Optimal Comfort and Wellbeing  Outcome: Progressing  Intervention: Monitor Pain and Promote Comfort  Flowsheets (Taken 5/2/2025 1016)  Pain Management Interventions:   quiet environment facilitated   care clustered  Intervention: Provide Person-Centered Care  Flowsheets (Taken 5/2/2025 1016)  Trust Relationship/Rapport:   care explained   choices provided   questions encouraged   thoughts/feelings acknowledged  Goal: Readiness for Transition of Care  Outcome: Progressing

## 2025-05-02 NOTE — CONSULTS
Consulted for a new finding of a skull base mass in this 79 year old gentleman with a past medical history of HTN, HLD, PVD, AS, migraine headaches, glaucoma, anemia and smoker who presented to the ER at [facility] on 5/1/2025 with a primary complaint of dizziness and N/V.  Patient's symptoms started about 2:00 p.m. the day prior to arrival in the ED. he reported room spinning when moving his head side-to-side and lightheadedness.  No viral prodrome.  Patient was bradycardic and hypertensive on admission.  WBCs 13.87. BNP and trop negative. CXR normal. UA normal.  CT of head showed a skull base mass so MRI brain was done in follow-up.  Patient was admitted to the hospital service and given IV fluids and antiemetics and meclizine.  Further imaging was ordered but patient refused stating he had too much contrast in his system already today.  He also said he did not understand why it was necessary.  I am meeting him with his wife and 2 daughters at the bedside.  He seems in a fairly pleasant mood and is a good historian.  He reports a long history of daily headaches and there have been nothing out of the ordinary of late.  No epistaxis.  He said he has not had problems with sinus infections since having a septoplasty 40 years ago.  He breathes well through his nose.  When asked about change in vision he laughed and said he had bilateral cataract surgery and a detached retina and has been closely followed by his eye doctor with whom he has an appointment on Wednesday of next week.  He sees his retina specialist annually now.  He said he has very poor vision in the left eye but it is unchanged recently.  He denies epistaxis.  He is a smoker.  He said he sometimes drinks a small amount of wine with his dinner but does not drink alcohol daily.  He does have reflux and said he takes 20 mg of Pepcid daily that he has been asking for but has not yet received.  No history of sinonasal trauma.  No rhinorrhea. Currently  patient said he does not feel dizzy.  He reports mild heartburn. No chest pain or shortness of breath. No claudication.  No dysuria. He has not been having loose stools. No fever or chills or myalgias.  Patient reports at present his dizziness has completely resolved.    Past medical history as above per HPI  Social history positive for daily tobacco use and alcohol use 3-5 times weekly  He is  with supportive children with spouse and 2 daughters at bedside  Review of systems as above    Patient alert and oriented in no distress and not requiring supplemental oxygen  Extraocular muscles are intact  Ear show moderate cerumen on right with clear middle ears  Nares with pale mucosa with no bleeding or crusting, no gross polyps noted, no mucopus  Oral cavity shows poor dentition with mandibular mauricio  Neck supple with no lymphadenopathy or masses    Labs noted and being addressed by his primary team    CT reviewed   MRI brain showed large expansile cystic mass with some mural thickening/enhancement most notable posteriorly centered within the sphenoid sinus and clivus but also involving the petrous apices bilaterally. Different diagnosis includes chordoma, large mucocele arising from the sphenoid sinus, large ecchordosis physaliphora with or without associated meningocele.  CTA PE showed no PE but 5 mm left lower lobe nodule.     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  This is outside the scope of my practice and will need to be addressed by skull base subspecialty ENT which is available at [facility] in [place] and [facility] in [place].  Patient has questions about the arachnoid cyst that I would defer to neuro.  He is aware of all findings including the small pulmonary nodule which they had questions about but I would defer to his primary.  The patient was very pleasant and said he appreciated the information  and everything I said made sense to him.  He said he would have a good night as long as he received his Pepcid which I was happy to order for him.

## 2025-05-02 NOTE — CONSULTS
Consults  [facility]    Cardiology  Consult Note    Patient Name: Fred Campos  MRN: [id number]  Admission Date: 5/1/2025  Hospital Length of Stay: 1 days  Code Status: Full Code   Attending Provider: Ha Ko,*   Consulting Provider: VLADIMIR Charles  Primary Care Physician: Ronna Shirley MD  Principal Problem:Tobacco dependency    Patient information was obtained from patient and ER records.     Subjective:     Chief Complaint/Reason for Consult: Symptomatic bradycardia      HPI:   Mr. Fred Campos is an 79 year old, unknown to [facility], who presented to [facility] on 5.1.25 with c/o of dizziness and vomiting. He has a history of HLD, PVD, valvular heart disease, and chronic migraines. Upon workup his EKG revealed SB., CT of the chest negative for PE, MRI of the brain revealed a cystic mass posteriorly centered within the sphenoid sinus and clivus but also involving the petrous  apices bilaterally. [facility] is being consulted for symptomatic bradycardia.    PMH: HLD, PVD, valvular heart disease, and chronic migraine  PSH: Appendectomy, glaucoma surgery  Family History: negative  Social History: Denied tobacco, alcohol and drug use    Previous Cardiac Diagnostics:   Echo 5.2.25 EF 60%, Mod AS    Past Medical History:   Diagnosis Date    Fatigue     Hiatal hernia 11/08/2023    HLD (hyperlipidemia)     Hyperkalemia 11/07/2022    Deescalate potassium intake with diet reassess levels at follow-up in consider the Lasix or Kayexalate if persistent elevation is noted    Migraine 11/08/2023    Myalgia due to statin 11/08/2023    PVD (peripheral vascular disease) 11/08/2023    Tobacco use 11/08/2023    Valvular heart disease 11/08/2023     Past Surgical History:   Procedure Laterality Date    APPENDECTOMY      cataract surgery      DESTRUCTION, CILIARY BODY, USING LASER Left 3/12/2024    Procedure: IRIDEX - OS 4/15;  Surgeon: Lona Gutierrez MD;  Location: [facility];  Service:  Ophthalmology;  Laterality: Left;    IMPLANTATION OF DEVICE FOR GLAUCOMA Left 2024    Procedure: XEN- OS;  Surgeon: Lona Gutierrez MD;  Location: [facility];  Service: Ophthalmology;  Laterality: Left;     Review of patient's allergies indicates:  No Known Allergies  No current facility-administered medications on file prior to encounter.     Current Outpatient Medications on File Prior to Encounter   Medication Sig    butalbital-acetaminophen-caffeine -40 mg (FIORICET, ESGIC) -40 mg per tablet Take 1 tablet by mouth every 6 (six) hours as needed for Pain or Headaches.    famotidine (PEPCID) 20 MG tablet Take 20 mg by mouth Daily.    dorzolamide (TRUSOPT) 2 % ophthalmic solution Place 1 drop into the left eye 2 (two) times daily. (Patient not taking: Reported on 2024)    dorzolamide-timolol 2-0.5% (COSOPT) 22.3-6.8 mg/mL ophthalmic solution SMARTSI Drop(s) Left Eye Morning-Night (Patient not taking: Reported on 2024)    methazoLAMIDE (NEPTAZANE) 50 MG Tab Take 50 mg by mouth 3 (three) times daily. (Patient not taking: Reported on 2024)    pravastatin (PRAVACHOL) 10 MG tablet Take 1 tablet (10 mg total) by mouth every other day. (Patient not taking: Reported on 2024)    prednisoLONE acetate (PRED FORTE) 1 % DrpS Place 1 drop into the left eye 2 (two) times daily.    VYZULTA 0.024 % Drop Apply 1 drop to eye. (Patient not taking: Reported on 2024)     Family History    None       Tobacco Use    Smoking status: Every Day     Current packs/day: 1.00     Types: Cigarettes    Smokeless tobacco: Never    Tobacco comments:     1ppd since 17y   Substance and Sexual Activity    Alcohol use: Not Currently     Comment: once at night    Drug use: Never    Sexual activity: Yes       Review of Systems   HENT: Negative.     Respiratory: Negative.     Cardiovascular: Negative.    Neurological:  Positive for dizziness and headaches.   All other systems reviewed and are  negative.    Objective:     Vital Signs (Most Recent):  Temp: 98.2 °F (36.8 °C) (05/02/25 0612)  Pulse: (!) 57 (05/02/25 0612)  Resp: 14 (05/02/25 0612)  BP: (!) 134/50 (05/02/25 0612)  SpO2: 99 % (05/02/25 0612) Vital Signs (24h Range):  Temp:  [97.5 °F (36.4 °C)-98.2 °F (36.8 °C)] 98.2 °F (36.8 °C)  Pulse:  [41-88] 57  Resp:  [12-22] 14  SpO2:  [85 %-100 %] 99 %  BP: (112-185)/(40-85) 134/50   Weight: 74.8 kg (165 lb)  Body mass index is 24.37 kg/m².  SpO2: 99 %       Intake/Output Summary (Last 24 hours) at 5/2/2025 0814  Last data filed at 5/1/2025 1909  Gross per 24 hour   Intake 1099 ml   Output --   Net 1099 ml     Lines/Drains/Airways       Peripheral Intravenous Line  Duration                  Peripheral IV - Single Lumen 05/01/25 1808 20 G Anterior;Proximal;Right Forearm <1 day                  Significant Labs:   Chemistries:   Recent Labs   Lab 05/01/25  1728 05/02/25  0501   * 134*   K 4.6 4.5   CL 96* 100   CO2 26 29   BUN 17.5 14.0   CREATININE 0.95 0.87   CALCIUM 9.2 8.7*   PROT 7.4 6.7   BILITOT 0.2 0.3   ALKPHOS 101 90   ALT 15 14   AST 19 19   MG 1.80 1.80   TROPONINI <0.010  --         CBC/Anemia Labs: Coags:    Recent Labs   Lab 05/01/25  1728 05/02/25  0501   WBC 13.87* 7.05   HGB 13.2* 12.2*   HCT 40.6* 37.2*    193   MCV 95.1* 95.6*   RDW 12.5 12.7    Recent Labs   Lab 05/01/25  1728   INR 0.9   APTT 31.0        Significant Imaging:  Imaging Results              CTA Chest Non-Coronary (PE Studies) (Preliminary result)  Result time 05/01/25 23:38:11      Preliminary result by Danny Ramires MD (05/01/25 23:38:11)                   Narrative:    START OF REPORT:  Technique: CT Scan of the chest was performed with intravenous contrast with direct axial images as well as sagittal and coronal reconstruction images pulmonary embolus protocol.    Dosage Information: Automated Exposure Control was utilized 204.37 mGy.cm.    Comparison: None.    Clinical History: Suspected PE, high  prob, denies SOB or CP.    Findings:  Soft Tissues: Unremarkable.  Lines and Tubes: None.  Neck: The visualized soft tissues of the neck appear unremarkable.  Mediastinum: The mediastinal structures are within normal limits.  Heart: The heart size is within normal limits. Moderate coronary artery calcification is seen.  Aorta: Mild aortic calcification is seen in the thoracic aorta.  Pulmonary Arteries: No filling defects are seen in the pulmonary arteries to suggest pulmonary embolus.  Lungs: No acute focal infiltrate or consolidation is identified in the lungs. There is a 1.2 cm diameter somewhat tubular solitary pulmonary nodule at the left lung base on images 62 through 73 of axial series 8 with suggestion of some micro spiculation on image 70 series 8.  Pleura: No effusions or pneumothorax are identified.  Bony Structures:  Spine: Mild spondylolytic changes are seen in the thoracic spine.  Abdomen: The visualized upper abdominal organs appear unremarkable.      Impression:  1. No filling defects are seen in the pulmonary arteries to suggest pulmonary embolus.  2. No acute focal infiltrate or consolidation is identified in the lungs. There is a 1.2 cm diameter somewhat tubular solitary pulmonary nodule at the left lung base on images 62 through 73 of axial series 8 with suggestion of some micro spiculation on image 70 series 8. This is somewhat worrisome for a neoplastic process. Correlate with clinical and laboratory findings as regards additional evaluation and recommend follow-up as indicated.  3. Details and other findings as discussed above.                                         MRI Brain W WO Contrast (Preliminary result)  Result time 05/02/25 07:39:59      Preliminary result by Eliu Yancey MD (05/02/25 07:39:59)                   Narrative:    START OF REPORT:  Technique: Multiplanar, multisequence magnetic resonance imaging of the brain was performed without and then with intravenous  contrast.    Comparison: Correlation is with CT study dated 2025-05-01 17:44:27.    Clinical history: Dizziness, n/v, abnormal ct.    Findings:  Hemorrhage: No acute intracranial hemorrhage is identified.  Stroke: No abnormal signal is identified on the diffusion images to suggest acute infarct.  Extra axial spaces: There is prominence of the retrocerebellar spaces seen. This may represent a randi cisternal magna versus arachnoid cyst.  Cerebellopontine angles: Normal.  Cerebral, cerebellar, and brainstem parenchyma: Within normal limits. No abnormal parenchymal enhancement is seen.  Vascular system: Normal flow voids.  Sella and skull base: There is a large fluid filled cavity centered in the sphenoid/sellar region with minimally irregular and enhancing margins measuring 4.6 x 4.2 cm seen centered on series 9 image 66. This may represent a cystic tumor versus post surgical cavity which appears similar to the prior CT scan findings.      Impression:  1. No abnormal parenchymal enhancement is seen.  2. There is a large fluid filled cavity centered in the sphenoid/sellar region with minimally irregular and enhancing margins measuring 4.6 x 4.2 cm seen centered on series 9 image 66. This may represent a cystic tumor versus post surgical cavity which appears similar to the prior CT scan findings. Correlate with clinical and laboratory findings as well as prior procedural or surgical history and recommend additional evaluation and follow-up as indicated.  3. Details and other findings as discussed above.                          Preliminary result by Interface, Rad Results In (05/01/25 20:38:16)                   Narrative:    START OF REPORT:  Technique: Multiplanar, multisequence magnetic resonance imaging of the brain was performed without and then with intravenous contrast.    Comparison: Correlation is with CT study dated 2025-05-01 17:44:27.    Clinical history: Dizziness, n/v, abnormal ct.    Findings:  Hemorrhage:  No acute intracranial hemorrhage is identified.  Stroke: No abnormal signal is identified on the diffusion images to suggest acute infarct.  Extra axial spaces: There is prominence of the retrocerebellar spaces seen. This may represent a randi cisternal magna versus arachnoid cyst.  Cerebellopontine angles: Normal.  Cerebral, cerebellar, and brainstem parenchyma: Within normal limits. No abnormal parenchymal enhancement is seen.  Vascular system: Normal flow voids.  Sella and skull base: There is a large fluid filled cavity centered in the sphenoid/sellar region with minimally irregular and enhancing margins measuring 4.6 x 4.2 cm seen centered on series 9 image 66. This may represent a cystic tumor versus post surgical cavity which appears similar to the prior CT scan findings.      Impression:  1. No abnormal parenchymal enhancement is seen.  2. There is a large fluid filled cavity centered in the sphenoid/sellar region with minimally irregular and enhancing margins measuring 4.6 x 4.2 cm seen centered on series 9 image 66. This may represent a cystic tumor versus post surgical cavity which appears similar to the prior CT scan findings. Correlate with clinical and laboratory findings as well as prior procedural or surgical history and recommend additional evaluation and follow-up as indicated.  3. Details and other findings as discussed above.                                         CT Head Without Contrast (Final result)  Result time 05/01/25 18:02:08      Final result by Eddie Mason MD (05/01/25 18:02:08)                   Impression:      Expansile lesion seen in the sphenoid sinus and sella and suprasellar region as also involving the clivus.  Contrast enhanced MRI correlation is recommended.  The patient has no surgical history in this region.    Cystic lesion in the posterior fossa likely representing an arachnoid cyst.  It appears benign.      Electronically signed by: Nestor  Isabella  Date:    05/01/2025  Time:    18:02               Narrative:    EXAMINATION:  CT HEAD WITHOUT CONTRAST    CLINICAL HISTORY:  Dizziness, persistent/recurrent, cardiac or vascular cause suspected;    TECHNIQUE:  Multiple axial images were obtained from the base of the brain to the vertex without contrast administration.  Sagittal and coronal reconstructions were performed. .Automatic exposure control  (AEC) is utilized to reduce patient radiation exposure.    COMPARISON:  None    FINDINGS:  There is no intracranial mass or lesion seen.  No hemorrhage is seen.  No infarct is seen.  The ventricles and basilar cisterns appear normal.  Brain parenchyma appears grossly unremarkable.    The cerebellum appears unremarkable.  There is a cystic lesion seen in the posterior fossa which likely represents an arachnoid cyst..  The calvarium is intact.  There is a area of mucosal thickening in the ethmoid sinus.  There is mucosal thickening in the maxillary sinuses bilaterally.  Mild mucosal thickening seen in the frontal sinus    There is an expansile lesion seen in the sphenoid sinus extending into the sella and suprasellar region.  There is also involvement of the clivus.  The lesion has fat density.  Is expansile and causes some remodeling of the bone at the base of the skull.                                       X-Ray Chest 1 View (Final result)  Result time 05/01/25 17:28:09      Final result by Eddie Mason MD (05/01/25 17:28:09)                   Impression:      No abnormality seen      Electronically signed by: Nestor Masno  Date:    05/01/2025  Time:    17:28               Narrative:    EXAMINATION:  XR CHEST 1 VIEW    CLINICAL HISTORY:  Shortness of breath    TECHNIQUE:  Single frontal view of the chest was performed.    COMPARISON:  None available    FINDINGS:  The lungs are clear.  The heart is normal appearance.  The pulmonary vascularity is unremarkable.  Aorta appears grossly unremarkable.   No pleural effusions are seen.  Bones and joints show no acute abnormality.                                    EKG:     Telemetry:  SB    Physical Exam  Vitals reviewed.   Constitutional:       Appearance: Normal appearance.   HENT:      Mouth/Throat:      Mouth: Mucous membranes are moist.   Cardiovascular:      Rate and Rhythm: Regular rhythm. Bradycardia present.      Pulses: Normal pulses.      Heart sounds: Normal heart sounds.   Pulmonary:      Effort: Pulmonary effort is normal.   Abdominal:      General: Bowel sounds are normal.      Palpations: Abdomen is soft.   Musculoskeletal:         General: Normal range of motion.      Cervical back: Normal range of motion and neck supple.   Skin:     General: Skin is warm and dry.      Capillary Refill: Capillary refill takes less than 2 seconds.   Neurological:      Mental Status: He is alert and oriented to person, place, and time.       Home Medications:   Medications Ordered Prior to Encounter[1]  Current Schedule Inpatient Medications:   nicotine  1 patch Transdermal Daily     Continuous Infusions:    Assessment:     Sinus bradycardia  Dizziness/Imbalance  Cystic mass posteriorly centered within the sphenoid sinus and clivus but also involving the petrous  apices bilaterally.--MRI of the brain 5/25  Mod AS  HLD  PVD    Plan:     Hold timolol  Obtain a one week MCT monitor for sinus bradycardia as an outpatient  Follow up with Dr. Syed 1-2 weeks after d/c  Will be available as needed.      Thank you for your consult.     Bonny Guzmán, VLADIMIR  Cardiology  [facility]          [1]   No current facility-administered medications on file prior to encounter.     Current Outpatient Medications on File Prior to Encounter   Medication Sig Dispense Refill    butalbital-acetaminophen-caffeine -40 mg (FIORICET, ESGIC) -40 mg per tablet Take 1 tablet by mouth every 6 (six) hours as needed for Pain or Headaches. 30 tablet 3     famotidine (PEPCID) 20 MG tablet Take 20 mg by mouth Daily.      dorzolamide (TRUSOPT) 2 % ophthalmic solution Place 1 drop into the left eye 2 (two) times daily. (Patient not taking: Reported on 2024)      dorzolamide-timolol 2-0.5% (COSOPT) 22.3-6.8 mg/mL ophthalmic solution SMARTSI Drop(s) Left Eye Morning-Night (Patient not taking: Reported on 2024)      methazoLAMIDE (NEPTAZANE) 50 MG Tab Take 50 mg by mouth 3 (three) times daily. (Patient not taking: Reported on 2024)      pravastatin (PRAVACHOL) 10 MG tablet Take 1 tablet (10 mg total) by mouth every other day. (Patient not taking: Reported on 2024) 45 tablet 3    prednisoLONE acetate (PRED FORTE) 1 % DrpS Place 1 drop into the left eye 2 (two) times daily.      VYZULTA 0.024 % Drop Apply 1 drop to eye. (Patient not taking: Reported on 2024)

## 2025-05-02 NOTE — ED PROVIDER NOTES
Encounter Date: 5/1/2025    SCRIBE #1 NOTE: I, Chay Hull, am scribing for, and in the presence of,  Russell Hall MD. I have scribed the following portions of the note - the EKG reading. Other sections scribed: HPI, ROS, PE.       History     Chief Complaint   Patient presents with    Dizziness    Vomiting     Pt states the dizziness started last night. Vomiting started at 2 pm. Pt up able to lift head up without vomiting.      79 year old male with a pmhx of HLD, PVD, valvular heart disease, and chronic migraines presents to the ED for dizziness onset last night.  The patient reports associated symptoms of nausea and vomiting.  He reports that he vomited multiple times within 3-4 hours this afternoon.  The patient reports that his dizziness is room-spinning, and causes him to have difficulty ambulating.  The patient denies ever having this in the past.      The history is provided by the patient.     Review of patient's allergies indicates:  No Known Allergies  Past Medical History:   Diagnosis Date    Fatigue     Hiatal hernia 11/08/2023    HLD (hyperlipidemia)     Hyperkalemia 11/07/2022    Deescalate potassium intake with diet reassess levels at follow-up in consider the Lasix or Kayexalate if persistent elevation is noted    Migraine 11/08/2023    Myalgia due to statin 11/08/2023    PVD (peripheral vascular disease) 11/08/2023    Tobacco use 11/08/2023    Valvular heart disease 11/08/2023     Past Surgical History:   Procedure Laterality Date    APPENDECTOMY      cataract surgery      DESTRUCTION, CILIARY BODY, USING LASER Left 3/12/2024    Procedure: IRIDEX - OS 4/15;  Surgeon: Lona Gutierrez MD;  Location: [facility] OR;  Service: Ophthalmology;  Laterality: Left;    IMPLANTATION OF DEVICE FOR GLAUCOMA Left 8/6/2024    Procedure: XEN- OS;  Surgeon: Lona Gutierrez MD;  Location: [facility] OR;  Service: Ophthalmology;  Laterality: Left;     No family history on file.  Social History[1]  Review of  Systems   Constitutional:  Negative for chills and fever.   HENT:  Negative for congestion, rhinorrhea and sore throat.    Eyes:  Negative for visual disturbance.   Respiratory:  Negative for cough and shortness of breath.    Cardiovascular:  Negative for chest pain.   Gastrointestinal:  Positive for nausea and vomiting. Negative for abdominal pain.   Genitourinary:  Negative for dysuria and hematuria.   Musculoskeletal:  Negative for joint swelling.   Skin:  Negative for rash.   Neurological:  Positive for dizziness. Negative for weakness.   Psychiatric/Behavioral:  Negative for confusion.    All other systems reviewed and are negative.      Physical Exam     Initial Vitals [05/01/25 1707]   BP Pulse Resp Temp SpO2   (!) 185/81 88 20 97.5 °F (36.4 °C) (!) 89 %      MAP       --         Physical Exam    Nursing note and vitals reviewed.  Constitutional: He is not diaphoretic. No distress.   HENT:   Head: Normocephalic and atraumatic.   Neck: Neck supple.   Normal range of motion.  Cardiovascular:  Normal rate and regular rhythm.           Pulmonary/Chest: Breath sounds normal. No respiratory distress.   Abdominal: Abdomen is soft. He exhibits no distension. There is no abdominal tenderness.   Musculoskeletal:         General: No edema.      Cervical back: Normal range of motion and neck supple.     Neurological: He is alert and oriented to person, place, and time. He has normal strength. No cranial nerve deficit or sensory deficit.   Skin: Skin is warm. Capillary refill takes less than 2 seconds.   Psychiatric: He has a normal mood and affect.         ED Course   Critical Care    Date/Time: 5/1/2025 9:57 PM    Performed by: Russell Hall IV, MD  Authorized by: Russell Hall IV, MD  Direct patient critical care time: 8 minutes  Additional history critical care time: 9 minutes  Ordering / reviewing critical care time: 7 minutes  Documentation critical care time: 6 minutes  Consulting other physicians critical care  time: 7 minutes  Consult with family critical care time: 6 minutes  Total critical care time (exclusive of procedural time) : 43 minutes  Critical care time was exclusive of separately billable procedures and treating other patients.  Critical care was necessary to treat or prevent imminent or life-threatening deterioration of the following conditions: CNS failure or compromise.  Critical care was time spent personally by me on the following activities: blood draw for specimens, development of treatment plan with patient or surrogate, discussions with consultants, ordering and performing treatments and interventions, obtaining history from patient or surrogate, examination of patient, interpretation of cardiac output measurements, evaluation of patient's response to treatment, ordering and review of laboratory studies, ordering and review of radiographic studies, pulse oximetry, re-evaluation of patient's condition and review of old charts.        Labs Reviewed   COMPREHENSIVE METABOLIC PANEL - Abnormal       Result Value    Sodium 133 (*)     Potassium 4.6      Chloride 96 (*)     CO2 26      Glucose 152 (*)     Blood Urea Nitrogen 17.5      Creatinine 0.95      Calcium 9.2      Protein Total 7.4      Albumin 4.2      Globulin 3.2      Albumin/Globulin Ratio 1.3      Bilirubin Total 0.2            ALT 15      AST 19      eGFR >60      Anion Gap 11.0      BUN/Creatinine Ratio 18     URINALYSIS, REFLEX TO URINE CULTURE - Abnormal    Color, UA Yellow      Appearance, UA Turbid (*)     Specific Gravity, UA 1.024      pH, UA 7.0      Protein, UA 1+ (*)     Glucose, UA Normal      Ketones, UA Negative      Blood, UA Negative      Bilirubin, UA Negative      Urobilinogen, UA Normal      Nitrites, UA Negative      Leukocyte Esterase, UA Negative      RBC, UA 0-5      WBC, UA 0-5      Bacteria, UA None Seen      Squamous Epithelial Cells, UA None Seen      Amorphous Crystal, UA Trace (*)    PROTIME-INR - Abnormal     PT 12.3 (*)     INR 0.9      Narrative:     Protimes are used to monitor anticoagulant agents such as warfarin. PT INR values are based on the current patient normal mean and the RUTHIE value for the specific instrument reagent used.  **Routine theraputic target values for the INR are 2.0-3.0**   CBC WITH DIFFERENTIAL - Abnormal    WBC 13.87 (*)     RBC 4.27 (*)     Hgb 13.2 (*)     Hct 40.6 (*)     MCV 95.1 (*)     MCH 30.9      MCHC 32.5 (*)     RDW 12.5      Platelet 220      MPV 9.1      Neut % 67.3      Lymph % 21.3      Mono % 9.5      Eos % 1.0      Basophil % 0.4      Imm Grans % 0.5      Neut # 9.32 (*)     Lymph # 2.96      Mono # 1.32 (*)     Eos # 0.14      Baso # 0.06      Imm Gran # 0.07 (*)     NRBC% 0.0     POCT GLUCOSE - Abnormal    POCT Glucose 147 (*)    TROPONIN I - Normal    Troponin-I <0.010     MAGNESIUM - Normal    Magnesium Level 1.80     APTT - Normal    PTT 31.0     B-TYPE NATRIURETIC PEPTIDE - Normal    Natriuretic Peptide 12.8     CBC W/ AUTO DIFFERENTIAL    Narrative:     The following orders were created for panel order CBC auto differential.  Procedure                               Abnormality         Status                     ---------                               -----------         ------                     CBC with Differential[[id number]]       Abnormal            Final result                 Please view results for these tests on the individual orders.   COVID/RSV/FLU A&B PCR     EKG Readings: (Independently Interpreted)   Initial Reading: No STEMI. Rhythm: Normal Sinus Rhythm. Heart Rate: 79. Ectopy: No Ectopy. Conduction: Normal. ST Segments: Normal ST Segments. T Waves: Normal. Axis: Normal. Clinical Impression: Normal Sinus Rhythm Other Impression: No obvious ischemic changes   Performed at 1712     ECG Results              EKG 12-lead (Final result)        Collection Time Result Time QRS Duration OHS QTC Calculation    05/01/25 17:12:24 05/01/25 18:29:08 102 467                      Final result by Interface, Lab In [facility] (05/01/25 18:29:13)                   Narrative:    Test Reason : R42,    Vent. Rate :  79 BPM     Atrial Rate :  79 BPM     P-R Int : 166 ms          QRS Dur : 102 ms      QT Int : 408 ms       P-R-T Axes :  78  81  64 degrees    QTcB Int : 467 ms    Normal sinus rhythm  Cannot rule out Inferior infarct ,age undetermined  Abnormal ECG  No previous ECGs available  Confirmed by Ronn Germain ([id number]) on 5/1/2025 6:29:05 PM    Referred By:            Confirmed By: Ronn Germain                                  Imaging Results              MRI Brain W WO Contrast (Preliminary result)  Result time 05/01/25 20:38:16      Preliminary result by Danny Ramires MD (05/01/25 20:38:16)                   Narrative:    START OF REPORT:  Technique: Multiplanar, multisequence magnetic resonance imaging of the brain was performed without and then with intravenous contrast.    Comparison: Correlation is with CT study dated 2025-05-01 17:44:27.    Clinical history: Dizziness, n/v, abnormal ct.    Findings:  Hemorrhage: No acute intracranial hemorrhage is identified.  Stroke: No abnormal signal is identified on the diffusion images to suggest acute infarct.  Extra axial spaces: There is prominence of the retrocerebellar spaces seen. This may represent a randi cisternal magna versus arachnoid cyst.  Cerebellopontine angles: Normal.  Cerebral, cerebellar, and brainstem parenchyma: Within normal limits. No abnormal parenchymal enhancement is seen.  Vascular system: Normal flow voids.  Sella and skull base: There is a large fluid filled cavity centered in the sphenoid/sellar region with minimally irregular and enhancing margins measuring 4.6 x 4.2 cm seen centered on series 9 image 66. This may represent a cystic tumor versus post surgical cavity which appears similar to the prior CT scan findings.      Impression:  1. No abnormal parenchymal enhancement is seen.  2. There is a large  fluid filled cavity centered in the sphenoid/sellar region with minimally irregular and enhancing margins measuring 4.6 x 4.2 cm seen centered on series 9 image 66. This may represent a cystic tumor versus post surgical cavity which appears similar to the prior CT scan findings. Correlate with clinical and laboratory findings as well as prior procedural or surgical history and recommend additional evaluation and follow-up as indicated.  3. Details and other findings as discussed above.                                         CT Head Without Contrast (Final result)  Result time 05/01/25 18:02:08      Final result by Eddie Mason MD (05/01/25 18:02:08)                   Impression:      Expansile lesion seen in the sphenoid sinus and sella and suprasellar region as also involving the clivus.  Contrast enhanced MRI correlation is recommended.  The patient has no surgical history in this region.    Cystic lesion in the posterior fossa likely representing an arachnoid cyst.  It appears benign.      Electronically signed by: Nestor Mason  Date:    05/01/2025  Time:    18:02               Narrative:    EXAMINATION:  CT HEAD WITHOUT CONTRAST    CLINICAL HISTORY:  Dizziness, persistent/recurrent, cardiac or vascular cause suspected;    TECHNIQUE:  Multiple axial images were obtained from the base of the brain to the vertex without contrast administration.  Sagittal and coronal reconstructions were performed. .Automatic exposure control  (AEC) is utilized to reduce patient radiation exposure.    COMPARISON:  None    FINDINGS:  There is no intracranial mass or lesion seen.  No hemorrhage is seen.  No infarct is seen.  The ventricles and basilar cisterns appear normal.  Brain parenchyma appears grossly unremarkable.    The cerebellum appears unremarkable.  There is a cystic lesion seen in the posterior fossa which likely represents an arachnoid cyst..  The calvarium is intact.  There is a area of mucosal thickening  in the ethmoid sinus.  There is mucosal thickening in the maxillary sinuses bilaterally.  Mild mucosal thickening seen in the frontal sinus    There is an expansile lesion seen in the sphenoid sinus extending into the sella and suprasellar region.  There is also involvement of the clivus.  The lesion has fat density.  Is expansile and causes some remodeling of the bone at the base of the skull.                                       X-Ray Chest 1 View (Final result)  Result time 05/01/25 17:28:09      Final result by Eddie Mason MD (05/01/25 17:28:09)                   Impression:      No abnormality seen      Electronically signed by: Nestor Mason  Date:    05/01/2025  Time:    17:28               Narrative:    EXAMINATION:  XR CHEST 1 VIEW    CLINICAL HISTORY:  Shortness of breath    TECHNIQUE:  Single frontal view of the chest was performed.    COMPARISON:  None available    FINDINGS:  The lungs are clear.  The heart is normal appearance.  The pulmonary vascularity is unremarkable.  Aorta appears grossly unremarkable.  No pleural effusions are seen.  Bones and joints show no acute abnormality.                                       Medications   sodium chloride 0.9% flush 10 mL (has no administration in time range)   melatonin tablet 6 mg (has no administration in time range)   ondansetron injection 4 mg (has no administration in time range)   prochlorperazine injection Soln 5 mg (has no administration in time range)   senna-docusate 8.6-50 mg per tablet 1 tablet (has no administration in time range)   bisacodyL suppository 10 mg (has no administration in time range)   aluminum-magnesium hydroxide-simethicone 200-200-20 mg/5 mL suspension 30 mL (has no administration in time range)   acetaminophen tablet 1,000 mg (has no administration in time range)   naloxone 0.4 mg/mL injection 0.02 mg (has no administration in time range)   glucose chewable tablet 16 g (has no administration in time range)    glucose chewable tablet 24 g (has no administration in time range)   dextrose 50% injection 12.5 g (has no administration in time range)   dextrose 50% injection 25 g (has no administration in time range)   glucagon (human recombinant) injection 1 mg (has no administration in time range)   nicotine 14 mg/24 hr 1 patch (has no administration in time range)   sodium chloride 0.9% bolus 1,000 mL 1,000 mL (0 mLs Intravenous Stopped 5/1/25 1909)   ondansetron injection 4 mg (4 mg Intravenous Given 5/1/25 1809)   acetaminophen 1,000 mg/100 mL (10 mg/mL) injection 1,000 mg (0 mg Intravenous Stopped 5/1/25 1859)   gadobenate dimeglumine (MULTIHANCE) injection 15 mL (15 mLs Intravenous Given 5/1/25 2004)     Medical Decision Making  78 yo  presenitng  with vertigo, difficulty ambulating   Neuro intact and asymptomatic at time of my evaluation   CT/MRI with suprasellar mass of unknown significance   Neurosurgery consulted and recommends admission  Hospitalist accepts admission  Hypoxia of unknown origin will require further workup       The differential diagnosis includes, but is not limited to: Judging by the patient's chief complaint and pertinent history, the patient has the following possible differential diagnoses, including but not limited to the following.  Some of these are deemed to be lower likelihood and some more likely based on my physical exam and history combined with possible lab work and/or imaging studies.   Please see the pertinent studies, and refer to the HPI.  Some of these diagnoses will take further evaluation to fully rule out, perhaps as an outpatient and the patient was encouraged to follow up when discharged for more comprehensive evaluation.    Peripheral vertigo: BPV, labyrinthitis (assoc with hearing loss), vestibular Neuronitis (recent viral infection), otitis Media, meniere's, trauma, FB.   Central vertigo:  vertebral basilar artery insufficiency, cerebellar hemorrhage, meningitis, multiple  Sclerosis, trauma, temporal lobe epilepsy, post-concussive syndrome, tumor.         Problems Addressed:  Bradycardia: acute illness or injury that poses a threat to life or bodily functions  Brain mass: acute illness or injury that poses a threat to life or bodily functions  Dizziness: acute illness or injury that poses a threat to life or bodily functions  Shortness of breath: acute illness or injury that poses a threat to life or bodily functions    Amount and/or Complexity of Data Reviewed  Labs: ordered.  Radiology: ordered and independent interpretation performed.     Details: CT head - mass as above    ECG/medicine tests: ordered and independent interpretation performed.     Details: Initial Reading: No STEMI. Rhythm: Normal Sinus Rhythm. Heart Rate: 79. Ectopy: No Ectopy. Conduction: Normal. ST Segments: Normal ST Segments. T Waves: Normal. Axis: Normal. Clinical Impression: Normal Sinus Rhythm Other Impression: No obvious ischemic changes   Performed at 1712   Discussion of management or test interpretation with external provider(s): Discussed with hospitalist - will admit  Discussed with neurosurgeyr - see ED course      Risk  Decision regarding hospitalization.            Scribe Attestation:   Scribe #1: I performed the above scribed service and the documentation accurately describes the services I performed. I attest to the accuracy of the note.    Attending Attestation:           Physician Attestation for Scribe:  Physician Attestation Statement for Scribe #1: I, Russell Hall MD, reviewed documentation, as scribed by Chay Hull in my presence, and it is both accurate and complete.             ED Course as of 05/01/25 2155   Thu May 01, 2025   1902 Paged neurosurgery [LH]   1910 Dr. Alvarado neurosurgery - agrees with MRI, will wait for MRI to determine need for transfer vs admission here   [AC]   2044 EKG sinus Neil marked sinus arrhythmia 46 normal axis intervals no ischemic changes 2041 [AC]    2110 Dr. Alvarado neurosurgery - admit to medicine (floor okay), will evaluate in AM  [AC]   2122 Paged hospitalist [LH]      ED Course User Index  [AC] Russell Hall IV, MD  [] Alex Damon                           Clinical Impression:  Final diagnoses:  [R06.02] Shortness of breath  [R42] Dizziness  [R00.1] Bradycardia  [G93.89] Brain mass (Primary)          ED Disposition Condition    Admit                   [1]   Social History  Tobacco Use    Smoking status: Every Day     Current packs/day: 1.00     Types: Cigarettes    Smokeless tobacco: Never    Tobacco comments:     1ppd since 17y   Substance Use Topics    Alcohol use: Not Currently     Comment: once at night    Drug use: Never        Russell Hall IV, MD  05/01/25 [id number]

## 2025-05-02 NOTE — PLAN OF CARE
05/02/25 1206   Discharge Assessment   Assessment Type Discharge Planning Assessment   Confirmed/corrected address, phone number and insurance Yes   Confirmed Demographics Correct on Facesheet   Source of Information patient   Communicated LORELEI with patient/caregiver Date not available/Unable to determine   Reason For Admission SOB   People in Home spouse   Do you expect to return to your current living situation? Yes   Do you have help at home or someone to help you manage your care at home? Yes   Who are your caregiver(s) and their phone number(s)? kishore Campos [phone]   Prior to hospitilization cognitive status: Alert/Oriented   Current cognitive status: Alert/Oriented   Walking or Climbing Stairs Difficulty no   Dressing/Bathing Difficulty no   Equipment Currently Used at Home cane, straight;walker, rolling;wheelchair  (has cane, walker, and wheelchair but does not use)   Do you currently have service(s) that help you manage your care at home? No   Do you take prescription medications? Yes   Do you have prescription coverage? Yes   Coverage [facility] medicare   Do you have any problems affording any of your prescribed medications? No   Is the patient taking medications as prescribed? yes   Who is going to help you get home at discharge? kishore Beal   How do you get to doctors appointments? car, drives self   Are you on dialysis? No   Do you take coumadin? No   Discharge Plan A Home   Discharge Plan B Home   DME Needed Upon Discharge  none   Discharge Plan discussed with: Patient   Transition of Care Barriers None   OTHER   Name(s) of People in Home kishore Beal

## 2025-05-03 VITALS
SYSTOLIC BLOOD PRESSURE: 111 MMHG | RESPIRATION RATE: 18 BRPM | HEIGHT: 68 IN | WEIGHT: 165.13 LBS | HEART RATE: 54 BPM | OXYGEN SATURATION: 97 % | BODY MASS INDEX: 25.03 KG/M2 | TEMPERATURE: 98 F | DIASTOLIC BLOOD PRESSURE: 66 MMHG

## 2025-05-03 PROBLEM — R42 DIZZINESS: Status: ACTIVE | Noted: 2025-05-03

## 2025-05-03 PROBLEM — J34.89 MASS OF SPHENOID SINUS: Status: ACTIVE | Noted: 2025-05-03

## 2025-05-03 LAB
ANION GAP SERPL CALC-SCNC: 6 MEQ/L
BASOPHILS # BLD AUTO: 0.03 X10(3)/MCL
BASOPHILS NFR BLD AUTO: 0.4 %
BUN SERPL-MCNC: 17.6 MG/DL (ref 8.4–25.7)
CALCIUM SERPL-MCNC: 8.5 MG/DL (ref 8.8–10)
CHLORIDE SERPL-SCNC: 99 MMOL/L (ref 98–107)
CO2 SERPL-SCNC: 29 MMOL/L (ref 23–31)
CREAT SERPL-MCNC: 0.84 MG/DL (ref 0.72–1.25)
CREAT/UREA NIT SERPL: 21
EOSINOPHIL # BLD AUTO: 0.37 X10(3)/MCL (ref 0–0.9)
EOSINOPHIL NFR BLD AUTO: 4.7 %
ERYTHROCYTE [DISTWIDTH] IN BLOOD BY AUTOMATED COUNT: 12.6 % (ref 11.5–17)
FOLATE SERPL-MCNC: 16.7 NG/ML (ref 7–31.4)
GFR SERPLBLD CREATININE-BSD FMLA CKD-EPI: >60 ML/MIN/1.73/M2
GLUCOSE SERPL-MCNC: 89 MG/DL (ref 82–115)
HCT VFR BLD AUTO: 35.2 % (ref 42–52)
HGB BLD-MCNC: 11.2 G/DL (ref 14–18)
IMM GRANULOCYTES # BLD AUTO: 0.01 X10(3)/MCL (ref 0–0.04)
IMM GRANULOCYTES NFR BLD AUTO: 0.1 %
LYMPHOCYTES # BLD AUTO: 3.08 X10(3)/MCL (ref 0.6–4.6)
LYMPHOCYTES NFR BLD AUTO: 39.4 %
MAGNESIUM SERPL-MCNC: 1.7 MG/DL (ref 1.6–2.6)
MCH RBC QN AUTO: 31.1 PG (ref 27–31)
MCHC RBC AUTO-ENTMCNC: 31.8 G/DL (ref 33–36)
MCV RBC AUTO: 97.8 FL (ref 80–94)
MONOCYTES # BLD AUTO: 0.96 X10(3)/MCL (ref 0.1–1.3)
MONOCYTES NFR BLD AUTO: 12.3 %
NEUTROPHILS # BLD AUTO: 3.36 X10(3)/MCL (ref 2.1–9.2)
NEUTROPHILS NFR BLD AUTO: 43.1 %
NRBC BLD AUTO-RTO: 0 %
PHOSPHATE SERPL-MCNC: 4 MG/DL (ref 2.3–4.7)
PLATELET # BLD AUTO: 167 X10(3)/MCL (ref 130–400)
PMV BLD AUTO: 9.7 FL (ref 7.4–10.4)
POTASSIUM SERPL-SCNC: 4.5 MMOL/L (ref 3.5–5.1)
RBC # BLD AUTO: 3.6 X10(6)/MCL (ref 4.7–6.1)
SODIUM SERPL-SCNC: 134 MMOL/L (ref 136–145)
TSH SERPL-ACNC: 0.51 UIU/ML (ref 0.35–4.94)
VIT B12 SERPL-MCNC: 1012 PG/ML (ref 213–816)
WBC # BLD AUTO: 7.81 X10(3)/MCL (ref 4.5–11.5)

## 2025-05-03 PROCEDURE — 84443 ASSAY THYROID STIM HORMONE: CPT | Performed by: NURSE PRACTITIONER

## 2025-05-03 PROCEDURE — 82746 ASSAY OF FOLIC ACID SERUM: CPT

## 2025-05-03 PROCEDURE — 80048 BASIC METABOLIC PNL TOTAL CA: CPT

## 2025-05-03 PROCEDURE — 84100 ASSAY OF PHOSPHORUS: CPT

## 2025-05-03 PROCEDURE — 83735 ASSAY OF MAGNESIUM: CPT

## 2025-05-03 PROCEDURE — 27000221 HC OXYGEN, UP TO 24 HOURS

## 2025-05-03 PROCEDURE — 82607 VITAMIN B-12: CPT

## 2025-05-03 PROCEDURE — 36415 COLL VENOUS BLD VENIPUNCTURE: CPT

## 2025-05-03 PROCEDURE — 85025 COMPLETE CBC W/AUTO DIFF WBC: CPT

## 2025-05-03 RX ORDER — ONDANSETRON 4 MG/1
4 TABLET, ORALLY DISINTEGRATING ORAL EVERY 8 HOURS PRN
Qty: 9 TABLET | Refills: 0 | Status: SHIPPED | OUTPATIENT
Start: 2025-05-03 | End: 2025-05-06

## 2025-05-03 RX ORDER — ATORVASTATIN CALCIUM 20 MG/1
20 TABLET, FILM COATED ORAL NIGHTLY
Qty: 30 TABLET | Refills: 0 | Status: SHIPPED | OUTPATIENT
Start: 2025-05-03 | End: 2025-06-02

## 2025-05-03 RX ORDER — IBUPROFEN 200 MG
1 TABLET ORAL DAILY
Qty: 30 PATCH | Refills: 0 | Status: SHIPPED | OUTPATIENT
Start: 2025-05-03 | End: 2025-06-02

## 2025-05-03 NOTE — PROGRESS NOTES
Spoke with Dr. Kwong of the hospitalist service this am. Patient is back to his baseline without acute symptoms this am. No emergent surgical intervention warranted. I feel he can go home with outpatient follow up. This is not a case I would take on, as it falls outside the scope of my practice. Pt welcome to discuss with his established ENT in [place], and if desired should call and ask to be seen this coming week. I recommend referral to Jorge Mar MD with [facility] skull base surgery at [facility] in [place] or [place] () [facility] [place].

## 2025-05-03 NOTE — DISCHARGE SUMMARY
[facility] Medicine Discharge Summary    Admit Date: 5/1/2025  Discharge Date and Time: 5/3/680573:14 AM  Admitting Physician:  Team  Discharging Physician: Ha Ko MD.  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 most notable posteriorly centered within the sphenoid sinus and clivus but also involving the petrous apices  # Macrocytic anemia (B12 and folate normal) - also dilutional  # Leukocytosis likely dehydration - resolved  # Hyponatremia  # 5 mm left lower lobe nodule and elongated opacity along a left lower lobe bronchus suspicious for an obstructing endobronchial lesion  # Tobacco use disorder  # Asymptomatic bradycardia  # Hx of HTN, HLD, PVD, AS, migraine headaches    Hospital Course:   79 year old man with a PMHx of HTN, HLD, PVD, AS, migraine headaches, glaucoma, anemia and tobacco use disorder presented to the ED at [facility] on 5/1/2025 with a primary complaint of dizziness and N/V.  Patient's symptoms started about 2:00 p.m. the day prior to arrival in the ED. Reports room spinning and lightheadedness. No dysuria or hematuria. No abdominal pain. No diarrhea. Bradycardic and hypertensive on admission.  WBCs 13.87. BNP and trop negative. CXR normal. UA normal. MRI brain showed large expansile cystic mass with some mural thickening/enhancement most notable posteriorly centered within the sphenoid sinus and clivus but also involving the petrous apices bilaterally. See full discussion above. Different diagnosis includes chordoma, large mucocele arising from the sphenoid sinus, large ecchordosis physaliphora with or without associated meningocele. CTA PE showed no PE but 5 mm left lower lobe nodule and elongated opacity along a left lower lobe bronchus suspicious for an obstructing endobronchial  lesion - no respiratory symptoms. Resp panel normal. TTE showed normal EF and moderate AS. US carotid was normal.      I spoke with the patient in the room 845. I informed him and the wife regarding the lung nodule. Cardiology saw the patient and recommended following up with Dr. Syed outpatient for MCT monitor. Neurosurgery saw patient and stated the arachnoid cyst is incidental and to consult ENT. For the Large expansile mass of sphenoid sinus involving clivus and petrous apices bilaterally - ENT said patient needs to be referred to skull base subspecialty ENT which is available at [facility] in [place] and [facility] in [place]. I spoke with Dr. Avery ENT and she said it is okay for that to be done on the outpatient basis. I spoke with the patient extensively - I told him that he needs to follow up with PCP, neurosurgery, ENT, cardiology. I also told him that he needs to follow up with the lung nodule clinic for surveillance - relayed concerns that it needs to be monitored for growth and potentially could be cancer. Also needs a bronchoscopy outpatient to look at the elongated opacity. I told him that he needs to follow up with ENT as soon as possible and not to delay following up. Patient refused additional scans I told him that he needs to see if another nodules present. The wife Lian was present for all the conversation.     Pt was seen and examined on the day of discharge. All questions answered. Neurologically stable. Vital signs stable. Stable to be discharged with PCP, cardiology, neurosurgery, ENT and lung nodule clinic. Also will give pulm referral for outpatient bronchoscopy. Patient wants to be discharged quickly and agrees to the follow up appointments mentioned.     Vitals:  VITAL SIGNS: 24 HRS MIN & MAX LAST   Temp  Min: 97.5 °F (36.4 °C)  Max: 98.1 °F (36.7 °C) 97.6 °F (36.4 °C)   BP  Min: 120/61  Max: 150/72 (!) 145/62   Pulse  Min: 49  Max: 77  60   Resp  Min:  18  Max: 19 19   SpO2  Min: 84 %  Max: 97 % 96 %       Physical Exam:  General appearance: no acute distress  Lungs: Clear to auscultation bilaterally. No wheezing present.   Heart: Regular rate and rhythm. S1 and S2 present systolic murmur; cap refill brisk, edema BLE  Abdomen: Soft, non-distended, non-tender. Bowel sounds are normal.   Neuro: A&Ox4, no focal deficits    Procedures Performed: No admission procedures for hospital encounter.     Significant Diagnostic Studies: See Full reports for all details    Recent Labs   Lab 05/01/25  1728 05/02/25  0501 05/03/25  0438   WBC 13.87* 7.05 7.81   RBC 4.27* 3.89* 3.60*   HGB 13.2* 12.2* 11.2*   HCT 40.6* 37.2* 35.2*   MCV 95.1* 95.6* 97.8*   MCH 30.9 31.4* 31.1*   MCHC 32.5* 32.8* 31.8*   RDW 12.5 12.7 12.6    193 167   MPV 9.1 9.5 9.7       Recent Labs   Lab 05/01/25  1728 05/02/25  0501 05/03/25  0438   * 134* 134*   K 4.6 4.5 4.5   CL 96* 100 99   CO2 26 29 29   BUN 17.5 14.0 17.6   CREATININE 0.95 0.87 0.84   * 105 89   CALCIUM 9.2 8.7* 8.5*   MG 1.80 1.80 1.70   ALBUMIN 4.2 3.8  --    PROT 7.4 6.7  --    ALKPHOS 101 90  --    ALT 15 14  --    AST 19 19  --    BILITOT 0.2 0.3  --         Microbiology Results (last 7 days)       ** No results found for the last 168 hours. **             CV Ultrasound Bilateral Doppler Carotid  The right internal carotid artery is patent with less than 50% stenosis.  The left internal carotid artery is patent with less than 50% stenosis.  Bilateral vertebral arteries are patent with antegrade flow.  Echo    Left Ventricle: The left ventricle is normal in size. Increased wall   thickness. There is normal systolic function with a visually estimated   ejection fraction of 60%. There is normal diastolic function.    Right Ventricle: Systolic function is normal. TAPSE is 2.3 cm.    Aortic Valve: There is moderate aortic valve sclerosis. Moderately   restricted motion. There is mild to moderate stenosis. Aortic valve  area   by VTI is 1.0 cm². Aortic valve peak velocity is 3.0 m/s. Mean gradient is   19 mmHg. The dimensionless index is 0.33.    Mitral Valve: There is mitral annular calcification. There is trace   regurgitation.    Tricuspid Valve: There is trace regurgitation.    Pulmonary Artery: The estimated pulmonary artery systolic pressure is   26 mmHg.    IVC/SVC: Normal venous pressure at 3 mmHg.    Pericardium: There is no pericardial effusion.  CTA Chest Non-Coronary (PE Studies)  Narrative: EXAMINATION:  CTA CHEST NON CORONARY (PE STUDIES)    CLINICAL HISTORY:  Pulmonary embolism (PE) suspected, high prob;short of breath.    TECHNIQUE:  Helical acquisition through the chest with IV contrast targeting the pulmonary arteries. Multiplanar and 3D MIP reconstructed images were provided for review.   mGycm. Automatic exposure control, adjustment of mA/kV or iterative reconstruction technique was used to reduce radiation.    COMPARISON:  No prior chest CT.    FINDINGS:  There is good opacification of the pulmonary arterial tree. There is no evidence of pulmonary thromboembolism.  There is no aortic dissection.    There is no mediastinal, hilar or axillary lymphadenopathy by size criteria.  There is gynecomastia.    Heart is not enlarged.  No pericardial effusion.  There are coronary artery calcifications.    There is no pleural effusion.  No consolidation suspicious for pneumonia.  There is elongated opacity along a left lower lobe segmental bronchus best visualized coronal image 75 series 9.  Findings are suspicious for an obstructing endobronchial lesion.  There is a 5 mm left lower lobe nodule image 76 series 8.    There are no acute findings in the imaged upper abdomen.  No acute osseous findings.  Impression: 1. Negative for pulmonary thromboembolic disease.  2. Elongated opacity along a left lower lobe bronchus suspicious for an obstructing endobronchial lesion.  Recommend correlation with  bronchoscopy.    Electronically signed by: Ziggy Alexandre  Date:    05/02/2025  Time:    09:23  MRI Brain W WO Contrast  Narrative: EXAMINATION:  MRI BRAIN W WO CONTRAST    CLINICAL HISTORY:  Dizziness, non-specific;expansile lesion noted on CT;    TECHNIQUE:  Multiplanar multisequence MR imaging of the brain was performed before and after the administration of 15 mL MultiHance intravenous contrast.    COMPARISON:  CT head without contrast 05/01/2025.    FINDINGS:  There is a 4.7 cm x 4.5 cm x 4 cm (AP, cc, transverse dimensions) expansile cystic lesion with some mural thickening/enhancement most notable posteriorly centered within the sphenoid sinus and clivus but also extending posterolaterally to involve the petrous apices.  It partially encases both intracranial internal carotid arteries.  On concurrent CT head without contrast there is a small amount of curvilinear calcification along the periphery of the lesion which may represent matrix calcification and/or destroyed bone fragments.  The floor of the sella is also dehiscent on concurrent head CT.    No evidence of acute ischemic infarct, parenchymal diffusion restriction, intracranial hemorrhage, hydrocephalus, or herniation.  Mild chronic small vessel ischemic changes along the supratentorial periventricular white matter.    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  No evidence of arachnoid cyst.    Bilateral lens replacements.  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  See full discussion above.  Different diagnosis includes chordoma, large mucocele arising from the sphenoid sinus, large ecchordosis physaliphora with or without associated meningocele.    Electronically signed by: Eliu Yancey  Date:    05/02/2025  Time:    08:23         Medication List        START taking these medications       atorvastatin 20 MG tablet  Commonly known as: LIPITOR  Take 1 tablet (20 mg total) by mouth every evening.     nicotine 21 mg/24 hr  Commonly known as: NICODERM CQ  Place 1 patch onto the skin once daily.     ondansetron 4 MG Tbdl  Commonly known as: ZOFRAN-ODT  Take 1 tablet (4 mg total) by mouth every 8 (eight) hours as needed.            CONTINUE taking these medications      butalbital-acetaminophen-caffeine -40 mg -40 mg per tablet  Commonly known as: FIORICET, ESGIC  Take 1 tablet by mouth every 6 (six) hours as needed for Pain or Headaches.     famotidine 20 MG tablet  Commonly known as: PEPCID     prednisoLONE acetate 1 % Drps  Commonly known as: PRED FORTE            STOP taking these medications      dorzolamide 2 % ophthalmic solution  Commonly known as: TRUSOPT     dorzolamide-timolol 2-0.5% 22.3-6.8 mg/mL ophthalmic solution  Commonly known as: COSOPT     methazoLAMIDE 50 MG Tab  Commonly known as: NEPTAZANE     pravastatin 10 MG tablet  Commonly known as: PRAVACHOL     VYZULTA 0.024 % Drop  Generic drug: latanoprostene bunod               Where to Get Your Medications        These medications were sent to [facility] [address] [address]      Phone: [phone]   atorvastatin 20 MG tablet  nicotine 21 mg/24 hr  ondansetron 4 MG Tbdl          Explained in detail to the patient about the discharge plan, medications, and follow-up visits. Pt understands and agrees with the treatment plan  Discharge Disposition: Home or Self Care   Discharged Condition: stable  Diet-   Dietary Orders (From admission, onward)       Start     Ordered    05/01/25 2135  Diet Heart Healthy Standard Tray  Diet effective now        Question:  Tray type:  Answer:  Standard Tray    05/01/25 2134                   Medications Per DC med rec  Activities as tolerated   Follow-up Information       Stef Syed MD Follow up in 2 day(s).    Specialty:  Cardiology  Why: follow up 2 weeks after D/C to set up a week long MCT Monitor for Bradycardia  Contact information:  [address]  [facility] [address]  [phone]               Christiano Fisher MD Follow up.    Specialties: Neurosurgery, Neurology  Why: follow up 2 weeks post D/C  Contact information:  [address] [place] [address] [address]  [place] [address]  [phone]               Ulises Loya MD. Call in 1 day(s).    Specialty: Neurosurgery  Why: Neurosurgery- Recommended by ENT  Contact information:  [address]  [facility] [address]  [phone]               Jorge Mar MD. Call in 1 day(s).    Specialty: Otolaryngology  Why: [facility]- Recommended by ENT  Contact information:  [address] [facility]  [address]  [facility] [address] [phone]               [facility]. Call in 1 day(s).    Why: Referral was sent by - Mason Read  Contact information:  [address] [place] [address] [address]  [facility] [address]             Ronna Shirley MD. Call in 1 week(s).    Specialty: Family Medicine  Contact information:  [address]  [facility] [address]  [facility] [id number] [phone]               Citlali Ladd MD. Call in 1 week(s).    Specialty: Pulmonary Disease  Why: for bronchoscopy  Contact information:  [address] [place] [address] [address] [place] [address] [address]  [phone]                           For further questions contact hospitalist office    Discharge time 75 minutes    For worsening symptoms, chest pain, shortness of breath, increased abdominal pain, high grade fever, stroke or stroke like symptoms, immediately go to the nearest Emergency Room or call 911 as soon as possible.      Ha Valdivia M.D on 5/3/2025. at 10:14 AM.

## 2025-05-03 NOTE — PLAN OF CARE
Referral sent via [facility] for ENT Follow up in [place] they will contact him with an appointment

## 2025-05-03 NOTE — PROGRESS NOTES
Neurosurgery  Established Patient    SUBJECTIVE:   No AE. AFVSS. Brennon rhinorrhea.     History of Present Illness:  79 y.o. male  consult for nasal mass extending to skull base with erosion of bony elements. Presented to the ED 5/1/25 with a primary complaint of dizziness and N/V that was new. 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 CT of head demonstrated expansile lesion seen in his sphenoid sinus and sella and suprasellar region.  MRI of the brain with and without contrast impression reviewed demonstrated no abnormal parenchymal enhancement but large fluid-filled cavity center in his sellar region.      Review of patient's allergies indicates:  No Known Allergies    Current Medications[1]    Past Medical History:   Diagnosis Date    Fatigue     Hiatal hernia 11/08/2023    HLD (hyperlipidemia)     Hyperkalemia 11/07/2022    Deescalate potassium intake with diet reassess levels at follow-up in consider the Lasix or Kayexalate if persistent elevation is noted    Migraine 11/08/2023    Myalgia due to statin 11/08/2023    PVD (peripheral vascular disease) 11/08/2023    Tobacco use 11/08/2023    Valvular heart disease 11/08/2023     Past Surgical History:   Procedure Laterality Date    APPENDECTOMY      cataract surgery      DESTRUCTION, CILIARY BODY, USING LASER Left 3/12/2024    Procedure: IRIDEX - OS 4/15;  Surgeon: Lona Gutierrez MD;  Location: [facility] OR;  Service: Ophthalmology;  Laterality: Left;    IMPLANTATION OF DEVICE FOR GLAUCOMA Left 8/6/2024    Procedure: XEN- OS;  Surgeon: Lona Gutierrez MD;  Location: [facility] OR;  Service: Ophthalmology;  Laterality: Left;     Family History    None       Social History     Socioeconomic History    Marital status:    Tobacco Use    Smoking status: Every Day     Current packs/day: 1.00     Types: Cigarettes    Smokeless tobacco: Never    Tobacco comments:     1ppd since 17y   Substance and Sexual Activity    Alcohol use: Not Currently      Comment: once at night    Drug use: Never    Sexual activity: Yes     Social Drivers of Health     Financial Resource Strain: Low Risk  (5/2/2025)    Overall Financial Resource Strain (CARDIA)     Difficulty of Paying Living Expenses: Not hard at all   Food Insecurity: No Food Insecurity (5/2/2025)    Hunger Vital Sign     Worried About Running Out of Food in the Last Year: Never true     Ran Out of Food in the Last Year: Never true   Transportation Needs: No Transportation Needs (5/2/2025)    PRAPARE - Transportation     Lack of Transportation (Medical): No     Lack of Transportation (Non-Medical): No   Physical Activity: Inactive (11/22/2024)    Exercise Vital Sign     Days of Exercise per Week: 0 days     Minutes of Exercise per Session: 0 min   Stress: No Stress Concern Present (5/2/2025)    [employer] [place] [employer] - Occupational Stress Questionnaire     Feeling of Stress : Not at all   Housing Stability: Low Risk  (5/2/2025)    Housing Stability Vital Sign     Unable to Pay for Housing in the Last Year: No     Number of Times Moved in the Last Year: 0     Homeless in the Last Year: No       Review of Systems  Constitutional:  Negative for activity change, appetite change, chills, diaphoresis, fatigue, fever and unexpected weight change.   HENT:  Negative for congestion, ear discharge, ear pain, hearing loss, mouth sores, nosebleeds, postnasal drip, rhinorrhea, sore throat and tinnitus.    Eyes:  Negative for photophobia, pain, discharge, itching and visual disturbance.   Respiratory:  Negative for apnea, chest tightness and shortness of breath.    Cardiovascular:  Negative for chest pain, palpitations and leg swelling.   Gastrointestinal:  Negative for abdominal distention, abdominal pain, blood in stool, constipation, diarrhea, nausea and vomiting.   Endocrine: Negative for cold intolerance and heat intolerance.   Genitourinary:  Negative for difficulty urinating, flank pain, hematuria and  "urgency.   Musculoskeletal:  Negative for arthralgias, back pain, gait problem, myalgias, neck pain and neck stiffness.   Skin:  Negative for color change and rash.   Allergic/Immunologic: Negative for environmental allergies, food allergies and immunocompromised state.   Neurological:  Negative for tremors,dizziness, syncope, light headedness, seizures, speech difficulty, weakness, numbness and headaches.   Hematological:  Negative for adenopathy. Does not bruise/bleed easily.   Psychiatric/Behavioral:  Negative for agitation, confusion, hallucinations and sleep disturbance.    All other systems reviewed and are negative.  OBJECTIVE:     Vital Signs  Temp: 98.1 °F (36.7 °C)  Pulse: (!) 54  Resp: 18  BP: 111/66  SpO2: 97 %  Height: 5' 8" (172.7 cm)  Weight: 74.9 kg (165 lb 2 oz)  Body mass index is 25.11 kg/m².    Neurosurgery Physical Exam  Nursing note and vitals reviewed.     Constitutional: He appears well-developed and well-nourished.      Eyes: Pupils are equal, round, and reactive to light. Conjunctivae and EOM are normal. Right eye exhibits no discharge. Left eye exhibits no discharge.   No scleral icterus.      Cardiovascular: Normal rate and intact distal pulses.      Abdominal: Soft. Bowel sounds are normal.      Skin: Skin displays no rash on trunk and no rash on extremities.      Psych/Behavior: He is alert. He is oriented to person, place, and time. He has a normal mood and affect.      Musculoskeletal: Gait is normal.        Neck: Range of motion is full. Muscle strength is 5/5. Tone is normal.        Back: Range of motion is full. Muscle strength is 5/5. Tone is normal.        Right Upper Extremities: Range of motion is full. Muscle strength is 5/5. Tone is normal.        Left Upper Extremities: Range of motion is full. Muscle strength is 5/5. Tone is normal.       Right Lower Extremities: Range of motion is full. Muscle strength is 5/5. Tone is normal.        Left Lower Extremities: Range of motion " is full. Muscle strength is 5/5. Tone is normal.      Neurological:        Coordination: He has a normal Romberg Test, normal finger to nose coordination, normal heel to shin coordination and normal tandem walking coordination.        Sensory: There is no sensory deficit in the trunk. There is no sensory deficit in the extremities.        DTRs: DTRs are DTRS NORMAL AND SYMMETRICnormal and symmetric. Tricep reflexes are 2+ on the right side and 2+ on the left side. Bicep reflexes are 2+ on the right side and 2+ on the left side. Brachioradialis reflexes are 2+ on the right side and 2+ on the left side. Patellar reflexes are 2+ on the right side and 2+ on the left side. Achilles reflexes are 2+ on the right side and 2+ on the left side.        Cranial nerves: Cranial nerve(s) II, IV, V, VI, VII, VIII, IX, X, XI and XII are intact. Chronic blindness L eye     Constitutional: appears well-developed and well-nourished. No distress. resting comfortably  HEENT: moist mucosal membranes, nose intact, ears intact, normocephalic, atraumatic, anicteric. Left face swollen (trauma) nontender.   Respiratory: intubated; no respiratory distress; mechanical ventilation, respirations normal, aerating well  Cardiovascular: regular rate and rhythm (easy if in ICU - monitor), extremities appear pink and well-perfused  Gastrointestinal (abdomen): soft, non-distended  Lymphatic: no edema  Musculoskeletal: strength exam, 5/5 throughout, moves all extremities  Skin: Skin displays no rash on extremities. Skin displays no lesions on extremities. Incision intact; incision healed  Neurologic: Cranial nerves grossly intact, face symmetric, tongue midline, normal coordination, reflexes normal  Psychiatric: Alert. Oriented to person, place, and time. 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 No emergent surgical needs. Discussed potential treatment including combined transnasal case w/ ENT for resection vs simple biopsy for diagnosis. All attendant risks/benefits/alternatives/indications discussed w/ patient. He states he sees an ENT locally (Dr. Nix?) in [place] and would like to follow up there; he has an appointment in 2 weeks. If he wishes to discuss further can be seen as outpatient in neurosurgery office in 2 weeks. Patient and wife voiced understanding and are in agreement.         Note dictated with voice recognition software, please excuse any grammatical errors.  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    Psoriasis    Elevated lipoprotein(a)    Myalgia due to statin    Tobacco use    PVD (peripheral vascular disease)    Valvular heart disease    Hiatal hernia    Migraine    Electrolyte abnormality    Normocytic anemia    Tinnitus of left ear    Tobacco dependency    Brain mass    Dizziness    Mass of sphenoid sinus              [1]   Current Facility-Administered Medications   Medication Dose Route Frequency Provider Last Rate Last Admin    acetaminophen tablet 650 mg  650 mg Oral Q6H PRN Ha Ko MD   650 mg at 05/02/25 1444    aluminum-magnesium hydroxide-simethicone 200-200-20 mg/5 mL suspension 30 mL  30 mL Oral QID PRN Lizabeth Gilbert FNP        atorvastatin tablet 20 mg  20 mg Oral QHS Ha Ko MD        bisacodyL suppository 10 mg  10 mg Rectal Daily PRN Lizabeth Gilbert FNP        butalbital-acetaminophen-caffeine -40 mg per tablet 1 tablet  1 tablet Oral Q6H PRN Ha Ko MD        dextrose 50% injection 12.5 g  12.5 g Intravenous PRN Lizabeth Gilbert FNP        dextrose 50% injection 25 g  25 g Intravenous PRN Lizabeth Gilbert FNP        enoxaparin injection 40 mg  40 mg Subcutaneous Q24H (prophylaxis, 1700) Maikel  Ha Wade MD        famotidine tablet 20 mg  20 mg Oral Daily Timo Avery MD   20 mg at 05/02/25 1832    glucagon (human recombinant) injection 1 mg  1 mg Intramuscular PRN Lizabeth Gilbert, VLADIMIR        glucose chewable tablet 16 g  16 g Oral PRN Lizabeth Gilbert, HUGHP        glucose chewable tablet 24 g  24 g Oral PRN Lizabeth Gilbert, FNP        melatonin tablet 6 mg  6 mg Oral Nightly PRN Lizabeth Gilbert, HUGHP        naloxone 0.4 mg/mL injection 0.02 mg  0.02 mg Intravenous PRN Lizabeth Gilbert, FNP        nicotine 14 mg/24 hr 1 patch  1 patch Transdermal Daily Elizabeth Zuleta FNP   1 patch at 05/02/25 0917    ondansetron injection 4 mg  4 mg Intravenous Q4H PRN Lizabeth Gilbert, FNP   4 mg at 05/02/25 2150    prochlorperazine injection Soln 5 mg  5 mg Intravenous Q6H PRN Lizabeth Gilbert, HUGHP        senna-docusate 8.6-50 mg per tablet 1 tablet  1 tablet Oral BID PRN Lizabeth Gilbert, HUGHP        sodium chloride 0.9% flush 10 mL  10 mL Intravenous PRN Lizabeth Gilbert, HUGHP         Current Outpatient Medications   Medication Sig Dispense Refill    butalbital-acetaminophen-caffeine -40 mg (FIORICET, ESGIC) -40 mg per tablet Take 1 tablet by mouth every 6 (six) hours as needed for Pain or Headaches. 30 tablet 3    famotidine (PEPCID) 20 MG tablet Take 20 mg by mouth Daily.      atorvastatin (LIPITOR) 20 MG tablet Take 1 tablet (20 mg total) by mouth every evening. 30 tablet 0    nicotine (NICODERM CQ) 21 mg/24 hr Place 1 patch onto the skin once daily. 30 patch 0    ondansetron (ZOFRAN-ODT) 4 MG TbDL Take 1 tablet (4 mg total) by mouth every 8 (eight) hours as needed. 9 tablet 0    prednisoLONE acetate (PRED FORTE) 1 % DrpS Place 1 drop into the left eye 2 (two) times daily.

## 2025-05-05 ENCOUNTER — TELEPHONE (OUTPATIENT)
Dept: NEUROSURGERY | Facility: CLINIC | Age: 80
End: 2025-05-05
Payer: MEDICARE

## 2025-05-05 ENCOUNTER — TELEPHONE (OUTPATIENT)
Dept: PRIMARY CARE CLINIC | Facility: CLINIC | Age: 80
End: 2025-05-05
Payer: MEDICARE

## 2025-05-05 ENCOUNTER — PATIENT OUTREACH (OUTPATIENT)
Dept: ADMINISTRATIVE | Facility: CLINIC | Age: 80
End: 2025-05-05
Payer: MEDICARE

## 2025-05-05 NOTE — PROGRESS NOTES
C3 nurse spoke with Fred Campos 's wife for a TCC post hospital discharge follow up call. The patient does not have a scheduled HOSFU appointment with Ronna Shirley MD  within 5-7 days post hospital discharge date 5/3/25.C3 nurse offered to scheduled [facility] hospital follow-up discharge follow-up call. Wife declined appointment at this time.

## 2025-05-05 NOTE — TELEPHONE ENCOUNTER
Copied from CRM #[id number]. Topic: Appointments - Appointment Scheduling  >> May 5, 2025 11:25 AM Amada wrote:  .Who Called: Fred Campos    Caller is requesting a sooner appointment. Caller declined first available appointment listed below. Caller will not accept being placed on the waitlist and is requesting a message be sent to doctor.    When is the first available appointment?06/2025  Options offered (Virtual Visit, Urgent Care): hfu  Symptoms:unk      Preferred Method of Contact: Phone Call  Patient's Preferred Phone Number on File: [phone]   Best Call Back Number, if different:  Additional Information: needs hfu soon

## 2025-05-05 NOTE — TELEPHONE ENCOUNTER
I spoke with the patients wife, Lian to advise her of Octavia's recommendations. They will hold off on seeing us for right now as the patient will be following up with an ENT in [place]. He is also scheduled to see his cardiologist this Thursday.

## 2025-05-13 PROBLEM — R91.1 SOLITARY LUNG NODULE: Status: ACTIVE | Noted: 2025-05-13

## 2025-05-13 PROBLEM — Z87.891 PERSONAL HISTORY OF NICOTINE DEPENDENCE: Status: ACTIVE | Noted: 2025-05-13

## 2025-05-13 NOTE — PHYSICIAN QUERY
"Due to the conflicting clinical picture, please clinically validate the diagnosis of "Acute respiratory failure with hypoxia-POA." If validated, please provide additional clinical support for the diagnosis.  The respiratory condition has been ruled out  "

## 2025-06-26 ENCOUNTER — HOSPITAL ENCOUNTER (EMERGENCY)
Facility: HOSPITAL | Age: 80
Discharge: HOME | End: 2025-06-26
Attending: STUDENT IN AN ORGANIZED HEALTH CARE EDUCATION/TRAINING PROGRAM
Payer: MEDICARE

## 2025-06-26 VITALS
SYSTOLIC BLOOD PRESSURE: 167 MMHG | DIASTOLIC BLOOD PRESSURE: 64 MMHG | BODY MASS INDEX: 25.01 KG/M2 | RESPIRATION RATE: 15 BRPM | HEIGHT: 68 IN | OXYGEN SATURATION: 96 % | WEIGHT: 165 LBS | HEART RATE: 41 BPM | TEMPERATURE: 98 F

## 2025-06-26 DIAGNOSIS — R00.1 ASYMPTOMATIC BRADYCARDIA: ICD-10-CM

## 2025-06-26 DIAGNOSIS — R10.9 RIGHT FLANK PAIN: Primary | ICD-10-CM

## 2025-06-26 LAB
ALBUMIN SERPL-MCNC: 3.8 G/DL (ref 3.4–4.8)
ALBUMIN/GLOB SERPL: 1.1 RATIO (ref 1.1–2)
ALP SERPL-CCNC: 104 UNIT/L (ref 40–150)
ALT SERPL-CCNC: 15 UNIT/L (ref 0–55)
AMORPH URATE CRY URNS QL MICRO: ABNORMAL /UL
ANION GAP SERPL CALC-SCNC: 8 MEQ/L
AST SERPL-CCNC: 15 UNIT/L (ref 11–45)
BACTERIA #/AREA URNS AUTO: ABNORMAL /HPF
BASOPHILS # BLD AUTO: 0.04 X10(3)/MCL
BASOPHILS NFR BLD AUTO: 0.4 %
BILIRUB SERPL-MCNC: 0.3 MG/DL
BILIRUB UR QL STRIP.AUTO: NEGATIVE
BUN SERPL-MCNC: 12 MG/DL (ref 8.4–25.7)
CALCIUM SERPL-MCNC: 9.1 MG/DL (ref 8.8–10)
CHLORIDE SERPL-SCNC: 95 MMOL/L (ref 98–107)
CLARITY UR: ABNORMAL
CO2 SERPL-SCNC: 27 MMOL/L (ref 23–31)
COLOR UR AUTO: YELLOW
CREAT SERPL-MCNC: 0.79 MG/DL (ref 0.72–1.25)
CREAT/UREA NIT SERPL: 15
EOSINOPHIL # BLD AUTO: 0.35 X10(3)/MCL (ref 0–0.9)
EOSINOPHIL NFR BLD AUTO: 3.3 %
ERYTHROCYTE [DISTWIDTH] IN BLOOD BY AUTOMATED COUNT: 12.4 % (ref 11.5–17)
GFR SERPLBLD CREATININE-BSD FMLA CKD-EPI: >60 ML/MIN/1.73/M2
GLOBULIN SER-MCNC: 3.4 GM/DL (ref 2.4–3.5)
GLUCOSE SERPL-MCNC: 105 MG/DL (ref 82–115)
GLUCOSE UR QL STRIP: NORMAL
HCT VFR BLD AUTO: 37.7 % (ref 42–52)
HGB BLD-MCNC: 12.7 G/DL (ref 14–18)
HGB UR QL STRIP: NEGATIVE
IMM GRANULOCYTES # BLD AUTO: 0.02 X10(3)/MCL (ref 0–0.04)
IMM GRANULOCYTES NFR BLD AUTO: 0.2 %
KETONES UR QL STRIP: NEGATIVE
LEUKOCYTE ESTERASE UR QL STRIP: NEGATIVE
LYMPHOCYTES # BLD AUTO: 3.91 X10(3)/MCL (ref 0.6–4.6)
LYMPHOCYTES NFR BLD AUTO: 37.1 %
MCH RBC QN AUTO: 31.7 PG (ref 27–31)
MCHC RBC AUTO-ENTMCNC: 33.7 G/DL (ref 33–36)
MCV RBC AUTO: 94 FL (ref 80–94)
MONOCYTES # BLD AUTO: 1.29 X10(3)/MCL (ref 0.1–1.3)
MONOCYTES NFR BLD AUTO: 12.2 %
NEUTROPHILS # BLD AUTO: 4.93 X10(3)/MCL (ref 2.1–9.2)
NEUTROPHILS NFR BLD AUTO: 46.8 %
NITRITE UR QL STRIP: NEGATIVE
NRBC BLD AUTO-RTO: 0 %
OHS QRS DURATION: 98 MS
OHS QTC CALCULATION: 453 MS
PH UR STRIP: 7.5 [PH]
PLATELET # BLD AUTO: 242 X10(3)/MCL (ref 130–400)
PMV BLD AUTO: 9.2 FL (ref 7.4–10.4)
POTASSIUM SERPL-SCNC: 4.6 MMOL/L (ref 3.5–5.1)
PROT SERPL-MCNC: 7.2 GM/DL (ref 5.8–7.6)
PROT UR QL STRIP: ABNORMAL
RBC # BLD AUTO: 4.01 X10(6)/MCL (ref 4.7–6.1)
RBC #/AREA URNS AUTO: ABNORMAL /HPF
SODIUM SERPL-SCNC: 130 MMOL/L (ref 136–145)
SP GR UR STRIP.AUTO: 1.02 (ref 1–1.03)
SQUAMOUS #/AREA URNS LPF: ABNORMAL /HPF
TROPONIN I SERPL-MCNC: <0.01 NG/ML (ref 0–0.04)
UROBILINOGEN UR STRIP-ACNC: NORMAL
WBC # BLD AUTO: 10.54 X10(3)/MCL (ref 4.5–11.5)
WBC #/AREA URNS AUTO: ABNORMAL /HPF

## 2025-06-26 PROCEDURE — 99285 EMERGENCY DEPT VISIT HI MDM: CPT | Mod: 25

## 2025-06-26 PROCEDURE — 85025 COMPLETE CBC W/AUTO DIFF WBC: CPT | Performed by: NURSE PRACTITIONER

## 2025-06-26 PROCEDURE — 81001 URINALYSIS AUTO W/SCOPE: CPT | Performed by: NURSE PRACTITIONER

## 2025-06-26 PROCEDURE — 96361 HYDRATE IV INFUSION ADD-ON: CPT

## 2025-06-26 PROCEDURE — 80053 COMPREHEN METABOLIC PANEL: CPT | Performed by: NURSE PRACTITIONER

## 2025-06-26 PROCEDURE — 25000003 PHARM REV CODE 250

## 2025-06-26 PROCEDURE — 63600175 PHARM REV CODE 636 W HCPCS: Mod: JZ,TB

## 2025-06-26 PROCEDURE — 93010 ELECTROCARDIOGRAM REPORT: CPT | Mod: ,,, | Performed by: INTERNAL MEDICINE

## 2025-06-26 PROCEDURE — 84484 ASSAY OF TROPONIN QUANT: CPT

## 2025-06-26 PROCEDURE — 93005 ELECTROCARDIOGRAM TRACING: CPT

## 2025-06-26 PROCEDURE — 96374 THER/PROPH/DIAG INJ IV PUSH: CPT

## 2025-06-26 RX ORDER — TRAMADOL HYDROCHLORIDE 50 MG/1
50 TABLET, FILM COATED ORAL EVERY 8 HOURS PRN
Qty: 12 TABLET | Refills: 0 | Status: SHIPPED | OUTPATIENT
Start: 2025-06-26

## 2025-06-26 RX ORDER — TIZANIDINE 4 MG/1
4 TABLET ORAL EVERY 8 HOURS PRN
Qty: 12 TABLET | Refills: 0 | Status: SHIPPED | OUTPATIENT
Start: 2025-06-26

## 2025-06-26 RX ORDER — LIDOCAINE 50 MG/G
1 PATCH TOPICAL DAILY
Qty: 10 PATCH | Refills: 0 | Status: SHIPPED | OUTPATIENT
Start: 2025-06-26

## 2025-06-26 RX ORDER — TIZANIDINE 4 MG/1
4 TABLET ORAL
Status: COMPLETED | OUTPATIENT
Start: 2025-06-26 | End: 2025-06-26

## 2025-06-26 RX ORDER — KETOROLAC TROMETHAMINE 30 MG/ML
15 INJECTION, SOLUTION INTRAMUSCULAR; INTRAVENOUS
Status: COMPLETED | OUTPATIENT
Start: 2025-06-26 | End: 2025-06-26

## 2025-06-26 RX ORDER — SODIUM CHLORIDE 9 MG/ML
1000 INJECTION, SOLUTION INTRAVENOUS
Status: COMPLETED | OUTPATIENT
Start: 2025-06-26 | End: 2025-06-26

## 2025-06-26 RX ADMIN — TIZANIDINE 4 MG: 4 TABLET ORAL at 02:06

## 2025-06-26 RX ADMIN — SODIUM CHLORIDE 1000 ML: 9 INJECTION, SOLUTION INTRAVENOUS at 12:06

## 2025-06-26 RX ADMIN — KETOROLAC TROMETHAMINE 15 MG: 30 INJECTION, SOLUTION INTRAMUSCULAR; INTRAVENOUS at 12:06

## 2025-06-26 NOTE — ED PROVIDER NOTES
"Encounter Date: 6/26/2025       History     Chief Complaint   Patient presents with    Back Pain     Presents POV with c/o atraumatic lower back pain x3 day. Reports side is worse. Denies urinary symptoms.      79 y.o. White male with a history of Hyperlipidemia, PVD, and Hernia presents to Emergency Department with a chief complaint of atraumatic back pain. Symptoms began 3 days ago and have been constant since onset. Reports recent IR LP performed and intrathecal contrast administered.  Associated symptoms include none. Symptoms are aggravated with palpation and there are no alleviating factors. Patient reports pain is "spasm" like. The patient denies CP, SOB, fever, chills, vomiting, abdominal pain, or injury/trauma. No other reported symptoms at this time      The history is provided by the patient. No  was used.   Back Pain   This is a new problem. The current episode started several days ago. The problem occurs throughout the day. The problem has been unchanged. The pain is associated with no known injury. Pain location: R lower back. Quality: spasm. The pain does not radiate. The pain is The same all the time. Stiffness is present All day. Pertinent negatives include no chest pain, no fever, no numbness, no weight loss, no headaches, no abdominal pain, no abdominal swelling, no bowel incontinence, no perianal numbness, no bladder incontinence, no dysuria, no pelvic pain, no leg pain, no paresthesias, no paresis, no tingling and no weakness. He has tried nothing for the symptoms. The treatment provided no relief.     Review of patient's allergies indicates:   Allergen Reactions    Brimonidine Other (See Comments)    Brimonidine-timolol Other (See Comments)    Brinzolamide-brimonidine Other (See Comments)     Past Medical History:   Diagnosis Date    Fatigue     Hiatal hernia 11/08/2023    HLD (hyperlipidemia)     Hyperkalemia 11/07/2022    Deescalate potassium intake with diet reassess " levels at follow-up in consider the Lasix or Kayexalate if persistent elevation is noted    Migraine 11/08/2023    Myalgia due to statin 11/08/2023    PVD (peripheral vascular disease) 11/08/2023    Tobacco use 11/08/2023    Valvular heart disease 11/08/2023     Past Surgical History:   Procedure Laterality Date    APPENDECTOMY      cataract surgery      DESTRUCTION, CILIARY BODY, USING LASER Left 3/12/2024    Procedure: IRIDEX - OS 4/15;  Surgeon: Lona Gutierrez MD;  Location: [facility] OR;  Service: Ophthalmology;  Laterality: Left;    IMPLANTATION OF DEVICE FOR GLAUCOMA Left 8/6/2024    Procedure: XEN- OS;  Surgeon: Lona Gutierrez MD;  Location: [facility] OR;  Service: Ophthalmology;  Laterality: Left;     No family history on file.  Social History[1]  Review of Systems   Constitutional:  Negative for chills, diaphoresis, fatigue, fever and weight loss.   Eyes:  Negative for photophobia and visual disturbance.   Respiratory:  Negative for cough, shortness of breath, wheezing and stridor.    Cardiovascular:  Negative for chest pain, palpitations and leg swelling.   Gastrointestinal:  Negative for abdominal pain, bowel incontinence, nausea and vomiting.   Genitourinary:  Negative for bladder incontinence, dysuria, enuresis, genital sores, hematuria and pelvic pain.   Musculoskeletal:  Positive for back pain. Negative for joint swelling and myalgias.   Neurological:  Negative for tingling, weakness, numbness, headaches and paresthesias.   All other systems reviewed and are negative.      Physical Exam     Initial Vitals [06/26/25 1101]   BP Pulse Resp Temp SpO2   (!) 163/60 (!) 51 19 97.5 °F (36.4 °C) 96 %      MAP       --         Physical Exam    Nursing note and vitals reviewed.  Constitutional: He appears well-developed and well-nourished. He is not diaphoretic. He is cooperative.  Non-toxic appearance. No distress.   HENT:   Head: Normocephalic and atraumatic.   Right Ear: External ear normal.   Left Ear:  External ear normal.   Nose: Nose normal.   Eyes: Conjunctivae and EOM are normal. Pupils are equal, round, and reactive to light.   Neck: Neck supple. No thyromegaly present. No tracheal deviation present.   Normal range of motion.  Cardiovascular:  Normal rate, regular rhythm, S1 normal, S2 normal, normal heart sounds, intact distal pulses and normal pulses.           Pulmonary/Chest: Effort normal and breath sounds normal. No accessory muscle usage or stridor. No tachypnea and no bradypnea. No respiratory distress. He has no decreased breath sounds. He has no wheezes. He has no rhonchi. He has no rales. He exhibits no tenderness.   Abdominal: Abdomen is soft. Bowel sounds are normal. He exhibits no distension. There is no abdominal tenderness.   There is right CVA tenderness.  No left CVA tenderness. There is no rebound and no guarding.   Musculoskeletal:         General: Normal range of motion.      Cervical back: Normal range of motion and neck supple.      Comments: No CTL spinous tenderness. FROM noted. CMS intact. All other adjacent joints otherwise normal.        Neurological: He is alert and oriented to person, place, and time. He has normal strength. No sensory deficit. GCS score is 15. GCS eye subscore is 4. GCS verbal subscore is 5. GCS motor subscore is 6.   Skin: Skin is warm and dry. Capillary refill takes less than 2 seconds.   Psychiatric: He has a normal mood and affect. Thought content normal.         ED Course   Procedures  Labs Reviewed   COMPREHENSIVE METABOLIC PANEL - Abnormal       Result Value    Sodium 130 (*)     Potassium 4.6      Chloride 95 (*)     CO2 27      Glucose 105      Blood Urea Nitrogen 12.0      Creatinine 0.79      Calcium 9.1      Protein Total 7.2      Albumin 3.8      Globulin 3.4      Albumin/Globulin Ratio 1.1      Bilirubin Total 0.3            ALT 15      AST 15      eGFR >60      Anion Gap 8.0      BUN/Creatinine Ratio 15     URINALYSIS, REFLEX TO URINE  CULTURE - Abnormal    Color, UA Yellow      Appearance, UA Turbid (*)     Specific Gravity, UA 1.018      pH, UA 7.5      Protein, UA 1+ (*)     Glucose, UA Normal      Ketones, UA Negative      Blood, UA Negative      Bilirubin, UA Negative      Urobilinogen, UA Normal      Nitrites, UA Negative      Leukocyte Esterase, UA Negative      RBC, UA 0-5      WBC, UA None Seen      Bacteria, UA Trace      Squamous Epithelial Cells, UA None Seen      Amorphous Crystal, UA Moderate (*)    CBC WITH DIFFERENTIAL - Abnormal    WBC 10.54      RBC 4.01 (*)     Hgb 12.7 (*)     Hct 37.7 (*)     MCV 94.0      MCH 31.7 (*)     MCHC 33.7      RDW 12.4      Platelet 242      MPV 9.2      Neut % 46.8      Lymph % 37.1      Mono % 12.2      Eos % 3.3      Basophil % 0.4      Imm Grans % 0.2      Neut # 4.93      Lymph # 3.91      Mono # 1.29      Eos # 0.35      Baso # 0.04      Imm Gran # 0.02      NRBC% 0.0     TROPONIN I - Normal    Troponin-I <0.010     CBC W/ AUTO DIFFERENTIAL    Narrative:     The following orders were created for panel order CBC Auto Differential.  Procedure                               Abnormality         Status                     ---------                               -----------         ------                     CBC with Differential[[id number]]       Abnormal            Final result                 Please view results for these tests on the individual orders.     EKG Readings: (Independently Interpreted)   Initial Reading: No STEMI. Rhythm: Sinus Bradycardia. Heart Rate: 51.     ECG Results              EKG 12-lead (Final result)        Collection Time Result Time QRS Duration OHS QTC Calculation    06/26/25 12:22:22 06/26/25 12:46:52 98 453                     Final result by Interface, Lab In [facility] (06/26/25 12:46:55)                   Narrative:    Test Reason : R00.1,    Vent. Rate :  51 BPM     Atrial Rate :  51 BPM     P-R Int : 172 ms          QRS Dur :  98 ms      QT Int : 492 ms       P-R-T  Axes :  56  59  62 degrees    QTcB Int : 453 ms    Sinus bradycardia  Incomplete right bundle branch block  Borderline Abnormal ECG    Confirmed by Garett Cardoza ([id number]) on 6/26/2025 12:46:51 PM    Referred By:            Confirmed By: Garett Cardoza                                  Imaging Results              CT Renal Stone Study ABD Pelvis WO (Final result)  Result time 06/26/25 14:27:00      Final result by Eddie Mason MD (06/26/25 14:27:00)                   Impression:      No abnormality seen in the kidneys ureters or bladder    Right inguinal hernia containing some fat      Electronically signed by: Nestor Mason  Date:    06/26/2025  Time:    14:27               Narrative:    EXAMINATION:  CT RENAL STONE STUDY ABD PELVIS WO    CLINICAL HISTORY:  Flank pain, kidney stone suspected;    TECHNIQUE:  Low dose axial images, sagittal and coronal reformations were obtained from the lung bases to the pubic symphysis.  No contrast was administered.  Automatic exposure control is utilized to reduce patient radiation exposure.    COMPARISON:  None    FINDINGS:  The lung bases are clear.    The liver appears normal.  No obvious liver mass or lesion is seen.    Gallbladder appears normal.  No gallstones are seen.    The pancreas appears normal.  No inflammatory changes are seen in the pancreatic region.    The spleen appears normal and adrenal glands appear normal.  No adrenal nodule is seen.    No nephrolithiasis is seen.  No hydronephrosis is seen.  No hydroureter is seen.  No ureteral stone is seen.    No colitis is seen.  No diverticulitis is seen.  No appendicitis is seen.  There is a right inguinal hernia containing some fat.  No obstruction is seen.    Extensive atherosclerotic plaque is seen in the abdominal aorta and iliac vessels.    No free air seen.  No free fluid is seen.  The urinary bladder appears normal.    Bones show no acute abnormality.                                        Medications   0.9% NaCl infusion (0 mLs Intravenous Stopped 6/26/25 1413)   ketorolac injection 15 mg (15 mg Intravenous Given 6/26/25 1210)   tiZANidine tablet 4 mg (4 mg Oral Given 6/26/25 1417)     Medical Decision Making  Patient awake, alert, has non-labored breathing, and follows commands appropriately. Arrived to ED due to atraumatic back pain. Symptoms began three days ago. Recently received intrathecal contrast to assess brain mass. Afebrile. NAD Noted. Denies injury/trauma.     Judging by the patient's chief complaint and pertinent history, the patient has the following possible differential diagnoses, including but not limited to the following: Back Pain, UTI, Kidney Stone, Dehydration, Muscle Strain      Some of these are deemed to be lower likelihood and some more likely based on my physical exam and history combined with possible lab work and/or imaging studies. Please see the pertinent studies, and refer to the HPI. Some of these diagnoses will take further evaluation to fully rule out, perhaps as an outpatient and the patient was encouraged to follow up when discharged for more comprehensive evaluation.       Amount and/or Complexity of Data Reviewed  External Data Reviewed: radiology.     Details: 06/16/25- IR LP CT sinus- 1. Very large expansile lesion of the central skull base with active contrast extravasation through the central and right side of the planum sphenoidale, most consistent with a meningocele    Patient aware of results.   Labs: ordered. Decision-making details documented in ED Course.     Details: No leukocytosis. Mild anemia noted. Na- 130, Chloride- 95. Patient reports electrolyte derangement is chronic for him. UA- unremarkable. Informed patient of results.   Radiology: ordered. Decision-making details documented in ED Course.     Details: Informed patient of results.   ECG/medicine tests: ordered.  Discussion of management or test interpretation with external provider(s):  Discussed patient and results with Dr. Hall, who agrees with plan of care. Patient to be discharged home with strict ER return precautions. Patient has no neuro deficits, pain is controlled, and has no signs of systemic infection. Offered cardiology consult due to asymptomatic bradycardia for holter monitor, patient declined. Wants to be discharged home and will f/u with cardiology on tomorrow. Discussed plan of care and interventions with patient. Agreed to and aware of plan of care. Comfortable being discharged home. Patient discharged home. Patient denies new or additional complaints; no further tests indicated at this time. Verbalized understanding of instructions. No emergent or apparent distress noted prior to discharge. Strict ER return precautions given.       Risk  OTC drugs.  Prescription drug management.               ED Course as of 06/26/25 1606   Thu Jun 26, 2025   1136 WBC: 10.54 [JA]   1136 Hemoglobin(!): 12.7 [JA]   1136 Hematocrit(!): 37.7 [JA]   1152 BUN: 12.0 [JA]   1152 Creatinine: 0.79 [JA]   1153 Sodium(!): 130 [JA]   1153 Chloride(!): 95 [JA]   1224 Patient bradycardic while in ER, reports this is chronic. Is supposed to be wearing holter monitor for 30 days but cardiology did not have any. Wore holter monitor for 72 hours previously. Has been awaiting a call from cardiology for 1 week. Sees Dr. Syed. Has no CP, SOB, palpitations, dizziness, etc.  [JA]   1253 Bacteria, UA: Trace [JA]   1253 WBC, UA: None Seen [JA]   1253 RBC, UA: 0-5 [JA]   1306 Discussed results with patient who reports pain improving since med admin. Patient opted for CT without contrast since he had so much contrast recently. Aware that lack of contrast is not as definitive for certain acute abnormalities. Patient is a retired pharmacist.  [JA]   1439 CT Renal Stone Study ABD Pelvis WO  No abnormality seen in the kidneys ureters or bladder. Right inguinal hernia containing some fat   [JA]   1528 Had lengthy discussion with  patient regarding results. Offered consult to cardiology and holter monitor placement while in ER, patient declined. Reports he will reach out to cardiologist regarding outpatient management on tomorrow. Counseled on returning to ER if he becomes symptomatic. Reports pain is improved since med admin, has no neuro deficits. Will discharge home with medication and f/u. At disposition, patient has no additional complaints, pain is not intractable, has outpatient f/u, has no saddle anesthesia, has no numbness/tingling, has no midline spinous tenderness, and has no B/B incontinence. Educated on when to return to ER.  [JA]      ED Course User Index  [JA] Toshia Lopes NP                           Clinical Impression:  Final diagnoses:  [R10.9] Right flank pain (Primary)  [R00.1] Asymptomatic bradycardia          ED Disposition Condition    Discharge Stable          ED Prescriptions       Medication Sig Dispense Start Date End Date Auth. Provider    tiZANidine (ZANAFLEX) 4 MG tablet Take 1 tablet (4 mg total) by mouth every 8 (eight) hours as needed. 12 tablet 6/26/2025 -- Toshia Lopes NP    LIDOcaine (LIDODERM) 5 % Place 1 patch onto the skin once daily. Remove & Discard patch within 12 hours or as directed by MD 10 patch 6/26/2025 -- Toshia Lopes NP    traMADoL (ULTRAM) 50 mg tablet Take 1 tablet (50 mg total) by mouth every 8 (eight) hours as needed for Pain. 12 tablet 6/26/2025 -- Toshia Lopes NP          Follow-up Information       Follow up With Specialties Details Why Contact Info    Ronna Shirley MD Family Medicine Schedule an appointment as soon as possible for a visit in 1 day  [address] [address]  [phone]      [facility] - Emergency Dept Emergency Medicine Go to  If symptoms worsen, As needed [address] [facility] [address] [phone]    Follow up with Madeline Mar in 1 day.        Follow up with cardiology  in 1 day                       [1]   Social History  Tobacco Use    Smoking status: Every Day     Current packs/day: 1.00     Average packs/day: 1 pack/day for 57.5 years (57.5 ttl pk-yrs)     Types: Cigarettes     Start date: 1968    Smokeless tobacco: Never    Tobacco comments:     1ppd since 17y   Substance Use Topics    Alcohol use: Not Currently     Comment: once at night    Drug use: Never        Toshia Lopes NP  06/26/25 2676

## 2025-06-26 NOTE — FIRST PROVIDER EVALUATION
Medical screening examination initiated.  I have conducted a focused provider triage encounter, findings are as follows:    Brief history of present illness:  Patient states bilateral back pain x 3 days. Denies any any injury or trauma. States that he felt like he may have a kidney stone.     There were no vitals filed for this visit.    Pertinent physical exam:  Awake, alert, ambulatory      Brief workup plan:  Labs    Preliminary workup initiated; this workup will be continued and followed by the physician or advanced practice provider that is assigned to the patient when roomed.

## 2025-07-21 ENCOUNTER — TELEPHONE (OUTPATIENT)
Dept: PRIMARY CARE CLINIC | Facility: CLINIC | Age: 80
End: 2025-07-21
Payer: MEDICARE

## 2025-07-21 DIAGNOSIS — R52 PAIN: ICD-10-CM

## 2025-07-21 RX ORDER — BUTALBITAL, ACETAMINOPHEN AND CAFFEINE 50; 325; 40 MG/1; MG/1; MG/1
1 TABLET ORAL EVERY 6 HOURS PRN
Qty: 30 TABLET | Refills: 3 | Status: SHIPPED | OUTPATIENT
Start: 2025-07-21

## 2025-07-21 NOTE — TELEPHONE ENCOUNTER
Copied from CRM #[id number]. Topic: Medications - Pharmacy  >> Jul 18, 2025  2:01 PM Vimal wrote:  .Who Called: Renetta with [facility]    Refill or New Rx:New Rx  RX Name and Strength:butalbital-acetaminophen-caffeine -40 mg (FIORICET, ESGIC) -40 mg per tablet  How is the patient currently taking it? (ex. 1XDay):Sig - Route: Take 1 tablet by mouth every 6 (six) hours as needed for Pain or Headaches. - Oral  Is this a 30 day or 90 day RX:30  Local or Mail Order:Local  List of preferred pharmacies on file (remove unneeded): [facility] - [address]   Phone: [phone]  Fax: [phone]        Ordering Provider:Ronna Shirley MD      Preferred Method of Contact: Phone Call    Patient's Preferred Phone Number on File: [phone]     Best Call Back Number, if different: [phone]    Additional Information: N/A